# Patient Record
Sex: FEMALE | Race: WHITE | URBAN - METROPOLITAN AREA
[De-identification: names, ages, dates, MRNs, and addresses within clinical notes are randomized per-mention and may not be internally consistent; named-entity substitution may affect disease eponyms.]

---

## 2021-09-17 ENCOUNTER — OFFICE VISIT (OUTPATIENT)
Dept: OBGYN CLINIC | Age: 41
End: 2021-09-17
Payer: COMMERCIAL

## 2021-09-17 VITALS
DIASTOLIC BLOOD PRESSURE: 70 MMHG | SYSTOLIC BLOOD PRESSURE: 104 MMHG | HEIGHT: 69 IN | BODY MASS INDEX: 23.55 KG/M2 | WEIGHT: 159 LBS

## 2021-09-17 DIAGNOSIS — N89.8 VAGINAL ITCHING: ICD-10-CM

## 2021-09-17 DIAGNOSIS — Z11.3 SCREEN FOR STD (SEXUALLY TRANSMITTED DISEASE): ICD-10-CM

## 2021-09-17 DIAGNOSIS — N76.0 RECURRENT VAGINITIS: ICD-10-CM

## 2021-09-17 DIAGNOSIS — N89.8 VAGINAL IRRITATION: ICD-10-CM

## 2021-09-17 DIAGNOSIS — R39.15 URINARY URGENCY: ICD-10-CM

## 2021-09-17 DIAGNOSIS — N89.8 VAGINAL DISCHARGE: Primary | ICD-10-CM

## 2021-09-17 LAB
BILIRUBIN, POC: NORMAL
BLOOD URINE, POC: NORMAL
CLARITY, POC: CLEAR
COLOR, POC: YELLOW
GLUCOSE URINE, POC: NORMAL
KETONES, POC: NORMAL
LEUKOCYTE EST, POC: NORMAL
NITRITE, POC: NORMAL
PH, POC: 6
PROTEIN, POC: NORMAL
SPECIFIC GRAVITY, POC: 1.02
UROBILINOGEN, POC: 3.5

## 2021-09-17 PROCEDURE — 81003 URINALYSIS AUTO W/O SCOPE: CPT | Performed by: ADVANCED PRACTICE MIDWIFE

## 2021-09-17 PROCEDURE — 99214 OFFICE O/P EST MOD 30 MIN: CPT | Performed by: ADVANCED PRACTICE MIDWIFE

## 2021-09-17 RX ORDER — SECNIDAZOLE 2 G/4.8G
1 GRANULE ORAL ONCE
Qty: 1 EACH | Refills: 2 | Status: SHIPPED | OUTPATIENT
Start: 2021-09-17 | End: 2021-09-17

## 2021-09-17 RX ORDER — FLUCONAZOLE 150 MG/1
TABLET ORAL
Qty: 3 TABLET | Refills: 1 | Status: SHIPPED | OUTPATIENT
Start: 2021-09-17

## 2021-09-17 ASSESSMENT — ENCOUNTER SYMPTOMS
CONSTIPATION: 0
SHORTNESS OF BREATH: 0
ABDOMINAL PAIN: 0
NAUSEA: 0
BACK PAIN: 0
COUGH: 0
DIARRHEA: 0
VOMITING: 0

## 2021-09-17 ASSESSMENT — PATIENT HEALTH QUESTIONNAIRE - PHQ9
1. LITTLE INTEREST OR PLEASURE IN DOING THINGS: 0
SUM OF ALL RESPONSES TO PHQ QUESTIONS 1-9: 0
2. FEELING DOWN, DEPRESSED OR HOPELESS: 0
SUM OF ALL RESPONSES TO PHQ9 QUESTIONS 1 & 2: 0
SUM OF ALL RESPONSES TO PHQ QUESTIONS 1-9: 0
SUM OF ALL RESPONSES TO PHQ QUESTIONS 1-9: 0

## 2021-09-17 NOTE — PROGRESS NOTES
SUBJECTIVE:  Arnoldo Alatorre is a 35 y.o. female who presents here today for complaints of:      Chief Complaint   Patient presents with    Vaginal Itching     new pt in with onset of sx 2-3 weeks ago, and with recurrent BV since delivering 2017.  Student     also symptoms of dryness    Urinary Tract Infection     patient reports symptoms of urgency to urinate     Vaginitis, Possible UTI - here today with complaints of:  Return of vaginal discharge with increased odor. Whenever antibiotics are taken, she then develops Itching/irritation.  Frustrated that bacterial symptoms have continued to return despite treatment with Metrogel.  Associated symptoms:   o Denies fever, headache, malaise, lymphadenopathy, myalgias. o Urinary frequency without dysuria and urgency.  Sexual Health:  o Gender of Sexual Partners:  Male  o Number of sexual contacts within the past 12 months:  1  o Possible exposure to an STD within the past 12 months:  No  o Personal history of STD's:  Herpes Simplex    Contraceptive Method:  None, Vasectomy    Review of Systems   Respiratory: Negative for cough and shortness of breath. Gastrointestinal: Negative for abdominal pain, constipation, diarrhea, nausea and vomiting. Genitourinary: Positive for frequency and vaginal discharge. Negative for difficulty urinating, dysuria, hematuria, menstrual problem, pelvic pain, urgency and vaginal bleeding. Musculoskeletal: Negative for back pain. All other systems reviewed and are negative.     OBJECTIVE:  Vitals:  /70   Ht 5' 9\" (1.753 m)   Wt 159 lb (72.1 kg)   LMP 09/10/2021   BMI 23.48 kg/m²       Physical Exam  Appearance:  Normal appearance  Cardiovascular:  Normal rate, Capillary refill less than 2 seconds  Pulmonary:  Normal effort, no distress  Abdominal:  No tenderness  MS:  No Swelling, No dependent edema  Skin:  Warm, dry  Neuro:  Alert and oriented x3, reflexes normal.  Psychiatric:  Normal mood and behavior    ASSESSMENT & PLAN:   Diagnosis Orders   1. Vaginal discharge  Wet Prep, Genital   2. Vaginal irritation  Wet Prep, Genital   3. Vaginal itching  Wet Prep, Genital   4. Recurrent vaginitis  fluconazole (DIFLUCAN) 150 MG tablet    Secnidazole (SOLOSEC) 2 g PACK   5. Urinary urgency  POCT Urinalysis No Micro (Auto)    Culture, Urine   6. Screen for STD (sexually transmitted disease)  Wet Prep, Genital    C.trachomatis N.gonorrhoeae DNA       1. Vaginitis Symptoms:  Discharge, Irritation, Itching  Frequent return of vaginitis symptoms despite treatment. Vaginitis, Bacterial - Rx for Solosec  Vaginitis, Candidal - Rx for Diflucan    2. Urinary Urgency  UA negative, sent for culture  Treat if indicated    3. Screening for STD's  Vaginal cultures collected    Return if symptoms worsen or fail to improve.     Jun Carbajal, YESENIA - RONNA

## 2021-09-17 NOTE — PROGRESS NOTES
The patient was asked if she would like a chaperone present for her intimate exam. She  Declined the chaperone.  Keyur Boyer MA

## 2023-03-23 ENCOUNTER — TELEPHONE (OUTPATIENT)
Dept: PRIMARY CARE | Facility: CLINIC | Age: 43
End: 2023-03-23
Payer: COMMERCIAL

## 2023-03-23 DIAGNOSIS — B96.89 SINUSITIS, BACTERIAL: Primary | ICD-10-CM

## 2023-03-23 DIAGNOSIS — J32.9 SINUSITIS, BACTERIAL: Primary | ICD-10-CM

## 2023-05-30 LAB
ERYTHROCYTE DISTRIBUTION WIDTH (RATIO) BY AUTOMATED COUNT: 14 % (ref 11.5–14.5)
ERYTHROCYTE MEAN CORPUSCULAR HEMOGLOBIN CONCENTRATION (G/DL) BY AUTOMATED: 31.7 G/DL (ref 32–36)
ERYTHROCYTE MEAN CORPUSCULAR VOLUME (FL) BY AUTOMATED COUNT: 91 FL (ref 80–100)
ERYTHROCYTES (10*6/UL) IN BLOOD BY AUTOMATED COUNT: 4.16 X10E12/L (ref 4–5.2)
HEMATOCRIT (%) IN BLOOD BY AUTOMATED COUNT: 37.9 % (ref 36–46)
HEMOGLOBIN (G/DL) IN BLOOD: 12 G/DL (ref 12–16)
LEUKOCYTES (10*3/UL) IN BLOOD BY AUTOMATED COUNT: 6.8 X10E9/L (ref 4.4–11.3)
PLATELETS (10*3/UL) IN BLOOD AUTOMATED COUNT: 259 X10E9/L (ref 150–450)

## 2023-05-31 LAB — PROLACTIN (UG/L) IN SER/PLAS: 8.7 UG/L (ref 3–20)

## 2023-08-02 LAB
CHLAMYDIA TRACH., AMPLIFIED: NEGATIVE
N. GONORRHEA, AMPLIFIED: NEGATIVE
TRICHOMONAS VAGINALIS: NEGATIVE

## 2023-10-19 PROBLEM — R00.2 PALPITATIONS: Status: ACTIVE | Noted: 2023-10-19

## 2023-10-19 PROBLEM — J34.3 NASAL TURBINATE HYPERTROPHY: Status: ACTIVE | Noted: 2023-10-19

## 2023-10-19 PROBLEM — A60.04 HERPES SIMPLEX VULVOVAGINITIS: Status: ACTIVE | Noted: 2023-10-19

## 2023-10-19 PROBLEM — F41.9 ANXIETY DISORDER: Status: ACTIVE | Noted: 2023-10-19

## 2023-10-19 PROBLEM — M54.50 CHRONIC LOW BACK PAIN: Status: ACTIVE | Noted: 2023-10-19

## 2023-10-19 PROBLEM — J31.0 CHRONIC RHINITIS: Status: ACTIVE | Noted: 2023-10-19

## 2023-10-19 PROBLEM — H69.90 EUSTACHIAN TUBE DYSFUNCTION: Status: ACTIVE | Noted: 2023-10-19

## 2023-10-19 PROBLEM — G89.29 CHRONIC LOW BACK PAIN: Status: ACTIVE | Noted: 2023-10-19

## 2023-10-19 PROBLEM — N94.6 DYSMENORRHEA: Status: ACTIVE | Noted: 2023-10-19

## 2023-10-19 PROBLEM — R92.30 DENSE BREAST TISSUE ON MAMMOGRAM: Status: ACTIVE | Noted: 2023-10-19

## 2023-10-19 PROBLEM — N93.9 ABNORMAL UTERINE BLEEDING (AUB): Status: ACTIVE | Noted: 2023-10-19

## 2023-10-19 PROBLEM — R51.9 HEADACHE: Status: ACTIVE | Noted: 2023-10-19

## 2023-10-19 PROBLEM — R53.83 FATIGUE: Status: ACTIVE | Noted: 2023-10-19

## 2023-10-19 RX ORDER — MULTIVITAMIN
1 TABLET ORAL DAILY
COMMUNITY
Start: 2021-03-18

## 2023-10-19 RX ORDER — FLUTICASONE PROPIONATE 50 MCG
SPRAY, SUSPENSION (ML) NASAL
COMMUNITY
Start: 2021-03-18 | End: 2024-04-01 | Stop reason: ALTCHOICE

## 2023-10-19 RX ORDER — FAMCICLOVIR 500 MG/1
TABLET ORAL
COMMUNITY
Start: 2020-08-04

## 2023-10-19 RX ORDER — DEXLANSOPRAZOLE 30 MG/1
20 CAPSULE, DELAYED RELEASE ORAL
COMMUNITY
Start: 2021-03-18

## 2023-10-20 ENCOUNTER — APPOINTMENT (OUTPATIENT)
Dept: OBSTETRICS AND GYNECOLOGY | Facility: CLINIC | Age: 43
End: 2023-10-20
Payer: COMMERCIAL

## 2023-10-27 ENCOUNTER — OFFICE VISIT (OUTPATIENT)
Dept: OBSTETRICS AND GYNECOLOGY | Facility: CLINIC | Age: 43
End: 2023-10-27
Payer: COMMERCIAL

## 2023-10-27 VITALS
HEIGHT: 69 IN | BODY MASS INDEX: 25.94 KG/M2 | WEIGHT: 175.13 LBS | SYSTOLIC BLOOD PRESSURE: 118 MMHG | DIASTOLIC BLOOD PRESSURE: 78 MMHG

## 2023-10-27 DIAGNOSIS — Z30.431 IUD CHECK UP: ICD-10-CM

## 2023-10-27 DIAGNOSIS — N93.9 ABNORMAL UTERINE BLEEDING (AUB): Primary | ICD-10-CM

## 2023-10-27 PROCEDURE — 99213 OFFICE O/P EST LOW 20 MIN: CPT | Performed by: STUDENT IN AN ORGANIZED HEALTH CARE EDUCATION/TRAINING PROGRAM

## 2023-10-27 RX ORDER — LEVONORGESTREL 52 MG/1
1 INTRAUTERINE DEVICE INTRAUTERINE ONCE
COMMUNITY
Start: 2023-08-01 | End: 2024-04-01 | Stop reason: ALTCHOICE

## 2023-10-27 NOTE — PROGRESS NOTES
Subjective   Patient ID [id number]   42 y/o  presenting for IUD check. Patient with AUB s/p negative workup including CBC, prolactin, and TVUS showing a 10cm uterus without masses. She then had Mirena IUD inserted on 23 without complication. Pt still having heavy painful periods lasting 2 weeks since IUD. Most recent period has gotten lighter, now only needing one super tampon per day. Taking NSAIDs for pain with some relief. Breakthrough bleeding seems to be improving.  is s/p vasectomy, has had intercourse without issue. Reports failing multiple prior methods including OCPs and Nuva ring. Does think she is starting to have hot flashes.       Objective   Physical Exam  Vitals:    10/27/23 0954   BP: 118/78      Gen: awake, alert  Head: NCAT  HEENT: moist mucus membranes  Pulm: breathing comfortably on room air  CV: warm and well-perfused  : no blood in vault, strings visualized at os  Neuro: alert and oriented  Psych: appropriate affect     Assessment/Plan     Mar Duque is a 43 y.o.  with AUB presenting for IUD string check    AUB  -TVUS images personally reviewed, 10cm uterus, suspect adenomyosis  -Mirena IUD placed , initially with heavy bleeding and cramping that seems to be improving  -Discussed trial of cOCPs vs scheduled NSAIDs. Desires to avoid estrogen. Will trial scheduled NSAIDs with next cycle  -Has failed multiple methods in the past. Had questions about copper IUD, would not recommend. Also discussed trial of high dose progestins, reviewed side effects including mood changes and weight gain, patient prefers to avoid  -Amenable to continuing LNG-IUD for now, discussed first line for adenomyosis. Did also review ablation (would not recommend given concern for adeno) as well as definitive management with hysterectomy. Will plan for close outpatient follow-up and continued monitoring of symptoms  -Has now turned 43 since AUB workup initiated, no other risk factors for  hyperplasia. Should AUB persist despite IUD, would consider EMB. To discuss at next visit    RTC in 3 months to follow-up menses    Tamia Sullivan MD

## 2023-12-21 ENCOUNTER — TELEPHONE (OUTPATIENT)
Dept: OBSTETRICS AND GYNECOLOGY | Facility: CLINIC | Age: 43
End: 2023-12-21
Payer: COMMERCIAL

## 2023-12-21 NOTE — TELEPHONE ENCOUNTER
Pt called regarding a question about her IUD patient refused to discuss further info on the subject

## 2023-12-22 DIAGNOSIS — B37.31 VAGINAL YEAST INFECTION: Primary | ICD-10-CM

## 2023-12-22 RX ORDER — FLUCONAZOLE 150 MG/1
150 TABLET ORAL ONCE
Qty: 1 TABLET | Refills: 0 | Status: SHIPPED | OUTPATIENT
Start: 2023-12-22 | End: 2023-12-22

## 2024-01-02 NOTE — TELEPHONE ENCOUNTER
Patient paged emergency line with symptoms of yeast infection. Called back, straight to voicemail. Left voicemail encouraging patient to call office tomorrow during business hours to discuss next steps.     Tamia Sullivan MD

## 2024-01-03 ENCOUNTER — TELEPHONE (OUTPATIENT)
Dept: OBSTETRICS AND GYNECOLOGY | Facility: CLINIC | Age: 44
End: 2024-01-03
Payer: COMMERCIAL

## 2024-01-03 DIAGNOSIS — B37.9 CANDIDA INFECTION: Primary | ICD-10-CM

## 2024-01-03 RX ORDER — FLUCONAZOLE 150 MG/1
150 TABLET ORAL ONCE
Qty: 1 TABLET | Refills: 0 | Status: SHIPPED | OUTPATIENT
Start: 2024-01-03 | End: 2024-01-03

## 2024-01-04 NOTE — TELEPHONE ENCOUNTER
Mar returned your call. She thought she pressed NR option. She might have hung up with me and tried again, but if she didn't reach you, Please call her back

## 2024-01-08 NOTE — TELEPHONE ENCOUNTER
Pt called back, I told her she needed to be scheduled, and she said she could not come in until 4pm any day as she can not take off any more work, I told her I would ask if we can even give her a 4pm with anyone as I did state it is used for OB patients.

## 2024-01-08 NOTE — TELEPHONE ENCOUNTER
Pt called in and believes both her and her  are passing the infection back and forth. She talked to a pharmacist yesterday who told her both of them would need a prescription. She is requesting a call back after 2:15pm today in regards to getting another prescription. I did tell her it looks like she is supposed to be seen, but will have her discuss with you.

## 2024-01-09 ENCOUNTER — OFFICE VISIT (OUTPATIENT)
Dept: OBSTETRICS AND GYNECOLOGY | Facility: CLINIC | Age: 44
End: 2024-01-09
Payer: COMMERCIAL

## 2024-01-09 VITALS
HEIGHT: 69 IN | WEIGHT: 179 LBS | BODY MASS INDEX: 26.51 KG/M2 | DIASTOLIC BLOOD PRESSURE: 78 MMHG | SYSTOLIC BLOOD PRESSURE: 120 MMHG

## 2024-01-09 DIAGNOSIS — N89.8 VAGINAL ITCHING: Primary | ICD-10-CM

## 2024-01-09 PROCEDURE — 87205 SMEAR GRAM STAIN: CPT

## 2024-01-09 PROCEDURE — 99214 OFFICE O/P EST MOD 30 MIN: CPT | Performed by: MIDWIFE

## 2024-01-09 PROCEDURE — 1036F TOBACCO NON-USER: CPT | Performed by: MIDWIFE

## 2024-01-09 RX ORDER — METRONIDAZOLE 500 MG/1
500 TABLET ORAL 2 TIMES DAILY
Qty: 14 TABLET | Refills: 0 | Status: SHIPPED | OUTPATIENT
Start: 2024-01-09 | End: 2024-01-16

## 2024-01-09 RX ORDER — FLUCONAZOLE 150 MG/1
150 TABLET ORAL ONCE
Qty: 2 TABLET | Refills: 0 | Status: SHIPPED | OUTPATIENT
Start: 2024-01-09 | End: 2024-01-09

## 2024-01-09 NOTE — PROGRESS NOTES
Subjective   Mar Duque is a 43 y.o. female who presents for evaluation of an abnormal vaginal discharge and bleeding. Symptoms have been present for several weeks off and on. Started with recent augmentin use that she finished 2 weeks ago. Treated with diflucan and symptoms returned with intercourse.    Vaginal symptoms: discharge described as malodorous and thick, local irritation, and itchy Contraception: IUD. She reports irregular bleeding since placement about 6 months ago. IUD was originally placed for heavy, painful menses. Strongly considering removal.      Objective   Physical Exam  Constitutional:       Appearance: Normal appearance.   Eyes:      Pupils: Pupils are equal, round, and reactive to light.   Cardiovascular:      Rate and Rhythm: Normal rate and regular rhythm.      Pulses: Normal pulses.      Heart sounds: Normal heart sounds.   Pulmonary:      Effort: Pulmonary effort is normal.   Genitourinary:     General: Normal vulva.      Vagina: Vaginal discharge present.   Musculoskeletal:      Cervical back: Normal range of motion.   Skin:     General: Skin is warm and dry.   Neurological:      General: No focal deficit present.      Mental Status: She is alert and oriented to person, place, and time.   Psychiatric:         Mood and Affect: Mood normal.         Behavior: Behavior normal.         Thought Content: Thought content normal.         Judgment: Judgment normal.          Assessment/Plan   A: vaginal discharge with odor      AUB      Mirena in place      Vaginal itching      Recurrent vaginosis    P: Reviewed BP, weight      Metronidazole and diflucan ordered      Discussed vaginal hygiene, recommended avoiding harsh soaps, fragrances       Recommended vitamin D supplementation and probiotics      Discussed IUD removal and uterine ablation      Will follow up per results      Consult with Dr. Tsai scheduled for ablation

## 2024-01-09 NOTE — PROGRESS NOTES
Patient being seen for abnormal vaginal itching/odor (started couple weeks ago)  Patient was given antibiotic   Patient spotting.  Has Mirena IUD   Patient concerned about weight gain from IUD.    Iris Farr MA

## 2024-01-10 ENCOUNTER — APPOINTMENT (OUTPATIENT)
Dept: OBSTETRICS AND GYNECOLOGY | Facility: CLINIC | Age: 44
End: 2024-01-10
Payer: COMMERCIAL

## 2024-01-10 LAB
CLUE CELLS VAG LPF-#/AREA: NORMAL /[LPF]
NUGENT SCORE: 0
YEAST VAG WET PREP-#/AREA: NORMAL

## 2024-01-11 DIAGNOSIS — N89.8 VAGINAL ITCHING: Primary | ICD-10-CM

## 2024-01-11 RX ORDER — CLOBETASOL PROPIONATE 0.5 MG/G
OINTMENT TOPICAL 2 TIMES DAILY
Qty: 45 G | Refills: 2 | Status: SHIPPED | OUTPATIENT
Start: 2024-01-11

## 2024-01-15 ENCOUNTER — TELEPHONE (OUTPATIENT)
Dept: OBSTETRICS AND GYNECOLOGY | Facility: CLINIC | Age: 44
End: 2024-01-15

## 2024-01-15 ENCOUNTER — APPOINTMENT (OUTPATIENT)
Dept: OBSTETRICS AND GYNECOLOGY | Facility: CLINIC | Age: 44
End: 2024-01-15
Payer: COMMERCIAL

## 2024-01-15 ENCOUNTER — OFFICE VISIT (OUTPATIENT)
Dept: OBSTETRICS AND GYNECOLOGY | Facility: CLINIC | Age: 44
End: 2024-01-15
Payer: COMMERCIAL

## 2024-01-15 VITALS
HEIGHT: 69 IN | SYSTOLIC BLOOD PRESSURE: 118 MMHG | DIASTOLIC BLOOD PRESSURE: 80 MMHG | BODY MASS INDEX: 26.53 KG/M2 | WEIGHT: 179.13 LBS

## 2024-01-15 DIAGNOSIS — B00.9 HSV (HERPES SIMPLEX VIRUS) INFECTION: Primary | ICD-10-CM

## 2024-01-15 DIAGNOSIS — N92.0 MENORRHAGIA WITH REGULAR CYCLE: ICD-10-CM

## 2024-01-15 PROCEDURE — 99213 OFFICE O/P EST LOW 20 MIN: CPT | Performed by: OBSTETRICS & GYNECOLOGY

## 2024-01-15 PROCEDURE — 58301 REMOVE INTRAUTERINE DEVICE: CPT | Performed by: OBSTETRICS & GYNECOLOGY

## 2024-01-15 PROCEDURE — 1036F TOBACCO NON-USER: CPT | Performed by: OBSTETRICS & GYNECOLOGY

## 2024-01-15 RX ORDER — VALACYCLOVIR HYDROCHLORIDE 500 MG/1
500 TABLET, FILM COATED ORAL DAILY
Qty: 90 TABLET | Refills: 3 | Status: SHIPPED | OUTPATIENT
Start: 2024-01-15 | End: 2025-01-14

## 2024-01-15 RX ORDER — VALACYCLOVIR HYDROCHLORIDE 1 G/1
1000 TABLET, FILM COATED ORAL 2 TIMES DAILY
Qty: 20 TABLET | Refills: 0 | Status: SHIPPED | OUTPATIENT
Start: 2024-01-15 | End: 2024-03-11 | Stop reason: SDUPTHER

## 2024-01-15 RX ORDER — TRANEXAMIC ACID 650 MG/1
1300 TABLET ORAL 3 TIMES DAILY
Qty: 30 TABLET | Refills: 11 | Status: SHIPPED | OUTPATIENT
Start: 2024-01-15 | End: 2024-04-01 | Stop reason: ALTCHOICE

## 2024-01-15 NOTE — TELEPHONE ENCOUNTER
Pt called in regards to her meds that where sent over. Agustina Karson sent over an ointment and wanted more clarification on that and also regarding if she has another out break if she should schedule an appt or will the fanvir be refilled.

## 2024-01-15 NOTE — PROGRESS NOTES
Pt would like to discuss removing IUD, and ablation.  Pt has had consistent bleeding since 8/23  Subjective   Patient ID: Mar Duque is a 43 y.o. female who presents for IUD removal and Ablation discussion.  HPI  Pt presents for irregular bleeding.  Pt got IUD Aug 2023.  Pt has bleeding from Aug to beginning of Nov.  Stopped bleeding for 1.5months.  Got Augmentin for a sinus infection and got a yeast infection.  Was just seen for BV/yeast last week.  Feels better since treatment.  Had some breakthrough bleeding with taking antibiotics.  Pt would to talk about an ablation or anything else that might help if possible.  She also wants her IUD out today.  She has irregular bleeding and back pain and wt gain with it.    Review of Systems  all other symptoms were found to be neg except for HPI/CC.      Objective   Physical Exam  General  General Appearance - normal build and Well groomed, Not in acute distress, No acute respiratory distress.  Mental Status - Alert.    Integumentary  - - warm and dry with no rashes.    Head and Neck  - - normalocephalic.    Eye  - - Bilateral - pupils equal and round and sclera clear.    Chest and Lung Exam  - - Bilateral - normal breathing effort.    Female Genitourinary  - - vulva normal without rash or lesion and no vaginal discharge.    Musculoskeletal  - - normal posture and normal gait and station.    Cervix: WNL, strings seen at 3cmPatient ID: Mar Duque is a 43 y.o. female.    IUD Removal    Date/Time: 1/15/2024 1:05 PM    Performed by: Cynthia Tsai DO  Authorized by: Cynthia Tsai DO    Consent:     Consent obtained:  Written    Consent given by:  Patient    Procedure risks and benefits discussed: yes      Patient questions answered: yes      Patient agrees, verbalizes understanding, and wants to proceed: yes      Educational handouts given: no      Instructions and paperwork completed: yes    Procedure:     Removed with no complications: yes      Removal due to  mechanical complications of IUD: no      Removal due to infection and inflammatory reaction: no      Other reason for removal:  Back pain, irregular bleeding        Assessment/Plan   Diagnoses and all orders for this visit:  HSV (herpes simplex virus) infection  -     valACYclovir (Valtrex) 1 gram tablet; Take 1 tablet (1,000 mg) by mouth 2 times a day for 10 days.  -     valACYclovir (Valtrex) 500 mg tablet; Take 1 tablet (500 mg) by mouth once daily.  Menorrhagia with regular cycle  -     tranexamic acid (Lysteda) 650 mg tablet tablet; Take 2 tablets (1,300 mg) by mouth 3 times a day.  Other orders  -     IUD Removal     Heavy periods  Talked about treatment options from medical to surgical   IUD removed  Lysteda ordered, went over how to take  Pt to call after next cycle to tell how she did    HSV: gave proph and treatment gregory Tsai DO 01/15/24 11:02 AM

## 2024-01-17 ENCOUNTER — TELEPHONE (OUTPATIENT)
Dept: OBSTETRICS AND GYNECOLOGY | Facility: CLINIC | Age: 44
End: 2024-01-17
Payer: COMMERCIAL

## 2024-01-17 NOTE — TELEPHONE ENCOUNTER
----- Message from JOSE LUIS Dean sent at 1/11/2024  9:51 AM EST -----  Vaginitis swab was negative. STILL continue taking the metronidazole/diflucan as we discussed in the office.  I am also ordering a clobetasol vaginal cream. After you've completed the metronidazole/diflucan, if/when itchiness returns (especially after intercourse) use the cream!

## 2024-01-26 ENCOUNTER — TELEPHONE (OUTPATIENT)
Dept: OBSTETRICS AND GYNECOLOGY | Facility: CLINIC | Age: 44
End: 2024-01-26
Payer: COMMERCIAL

## 2024-01-26 DIAGNOSIS — B37.9 YEAST INFECTION: Primary | ICD-10-CM

## 2024-01-26 DIAGNOSIS — B96.89 BACTERIAL VAGINOSIS: Primary | ICD-10-CM

## 2024-01-26 DIAGNOSIS — N76.0 BACTERIAL VAGINOSIS: Primary | ICD-10-CM

## 2024-01-26 RX ORDER — METRONIDAZOLE 7.5 MG/G
GEL VAGINAL DAILY
Qty: 70 G | Refills: 0 | Status: SHIPPED | OUTPATIENT
Start: 2024-01-26 | End: 2024-01-31

## 2024-01-26 NOTE — TELEPHONE ENCOUNTER
Pt saw AW on 1/15 and MS on 1/9. Tested negative for BV/yeast but says she is still feeling irritation and itch. Please call pt to advise. Pt states that if something is called in she prefers the gel for BV rather than pills.

## 2024-01-29 DIAGNOSIS — B37.0 ORAL YEAST INFECTION: Primary | ICD-10-CM

## 2024-01-29 RX ORDER — FLUCONAZOLE 100 MG/1
100 TABLET ORAL DAILY
Qty: 10 TABLET | Refills: 0 | Status: SHIPPED | OUTPATIENT
Start: 2024-01-29 | End: 2024-02-08

## 2024-01-29 NOTE — TELEPHONE ENCOUNTER
Called patient   LVM informing of Dr Tsai message    Cynthia Tsai, DO  You3 days ago       Called in metrogel for this patient since weekend.  If Pt gets done with this and it is still there, will need to be seen again.     Iris Farr MA

## 2024-02-16 ENCOUNTER — APPOINTMENT (OUTPATIENT)
Dept: OBSTETRICS AND GYNECOLOGY | Facility: CLINIC | Age: 44
End: 2024-02-16
Payer: COMMERCIAL

## 2024-02-26 ENCOUNTER — APPOINTMENT (OUTPATIENT)
Dept: PRIMARY CARE | Facility: CLINIC | Age: 44
End: 2024-02-26
Payer: COMMERCIAL

## 2024-03-05 DIAGNOSIS — B00.9 HSV (HERPES SIMPLEX VIRUS) INFECTION: ICD-10-CM

## 2024-03-11 RX ORDER — VALACYCLOVIR HYDROCHLORIDE 1 G/1
1000 TABLET, FILM COATED ORAL 2 TIMES DAILY
Qty: 20 TABLET | Refills: 0 | Status: SHIPPED | OUTPATIENT
Start: 2024-03-11 | End: 2024-03-21

## 2024-03-13 ENCOUNTER — OFFICE VISIT (OUTPATIENT)
Dept: ORTHOPEDIC SURGERY | Facility: CLINIC | Age: 44
End: 2024-03-13
Payer: COMMERCIAL

## 2024-03-13 ENCOUNTER — HOSPITAL ENCOUNTER (OUTPATIENT)
Dept: RADIOLOGY | Facility: CLINIC | Age: 44
Discharge: HOME | End: 2024-03-13
Payer: COMMERCIAL

## 2024-03-13 DIAGNOSIS — M25.562 LEFT KNEE PAIN, UNSPECIFIED CHRONICITY: ICD-10-CM

## 2024-03-13 DIAGNOSIS — S83.242S ACUTE MEDIAL MENISCUS TEAR, LEFT, SEQUELA: ICD-10-CM

## 2024-03-13 PROCEDURE — 99213 OFFICE O/P EST LOW 20 MIN: CPT | Performed by: ORTHOPAEDIC SURGERY

## 2024-03-13 PROCEDURE — 73564 X-RAY EXAM KNEE 4 OR MORE: CPT | Mod: LT

## 2024-03-13 PROCEDURE — 99203 OFFICE O/P NEW LOW 30 MIN: CPT | Performed by: ORTHOPAEDIC SURGERY

## 2024-03-13 PROCEDURE — 73564 X-RAY EXAM KNEE 4 OR MORE: CPT | Mod: LEFT SIDE | Performed by: ORTHOPAEDIC SURGERY

## 2024-03-13 PROCEDURE — 1036F TOBACCO NON-USER: CPT | Performed by: ORTHOPAEDIC SURGERY

## 2024-03-13 RX ORDER — MELOXICAM 15 MG/1
15 TABLET ORAL DAILY
Qty: 30 TABLET | Refills: 0 | Status: SHIPPED | OUTPATIENT
Start: 2024-03-13 | End: 2024-04-01 | Stop reason: ALTCHOICE

## 2024-03-13 NOTE — PROGRESS NOTES
History of Present Illness:   The patient presents today endorsing left knee pain. The pain localizes over the medial joint line.  The patient denies any recent trauma and notes the insidious onset of pain.  The pain is achy, constant, worse with activity and better with rest. The patient notes occasional locking, catching and giving out.  The patient has tried the following modalities: Rest, ice, Tylenol, bracing.    Patient has been wary of NSAIDs due to side effect profile, she does not have kidney damage, does not have history of stomach ulcers, and does not have uncontrolled hypertension.    She is inquiring today about options for MRI.  Her  is a patient of ours who had previously undergone total knee arthroplasty.  She currently works as a lunch aide for a school system.    No past medical history on file.  No past surgical history on file.    Current Outpatient Medications:     clobetasol (Temovate) 0.05 % ointment, Apply topically 2 times a day., Disp: 45 g, Rfl: 2    dexlansoprazole (Dexilant) 30 mg DR capsule, Take 20 mg by mouth once daily in the morning. Take before meals., Disp: , Rfl:     famciclovir (Famvir) 500 mg tablet, TAKE 2 TABLET Every twelve hours PRN outbreak take 2 doses for a total of 4 tabs per episode then stop, Disp: , Rfl:     fluticasone (Flonase) 50 mcg/actuation nasal spray, SHAKE LIQUID AND USE 2 SPRAYS IN EACH NOSTRIL DAILY, Disp: , Rfl:     levonorgestrel (Mirena) 21 mcg/24 hours (8 yrs) 52 mg IUD, 52 mg by intrauterine route 1 time., Disp: , Rfl:     multivitamin tablet, Take 1 tablet by mouth once daily., Disp: , Rfl:     tranexamic acid (Lysteda) 650 mg tablet tablet, Take 2 tablets (1,300 mg) by mouth 3 times a day., Disp: 30 tablet, Rfl: 11    valACYclovir (Valtrex) 1 gram tablet, TAKE 1 TABLET (1,000 MG) BY MOUTH 2 TIMES A DAY FOR 10 DAYS., Disp: 20 tablet, Rfl: 0    valACYclovir (Valtrex) 500 mg tablet, Take 1 tablet (500 mg) by mouth once daily., Disp: 90 tablet,  Rfl: 3    Review of Systems   GENERAL: Negative for malaise, significant weight loss, fever  MUSCULOSKELETAL: See HPI  NEURO:  Negative for numbness / tingling     Physical Examination:  Left Knee:  Skin healthy and intact  No gross swelling or ecchymosis  No significant varus or valgus malalignment  Effusion: absent    ROM:  Full flexion   Full extension  No pain with internal rotation of the hip  Tenderness to palpation:  medial joint line   No laxity to valgus stress  No laxity to varus stress  Negative Lachman´s test  Negative anterior drawer test  Negative posterior drawer test  Positive Sheba´s test  Neurovascular exam normal distally    Imaging:  Plain films of the left knee reveal no acute fractures or dislocation, good alignment and position, no substantial signs of degenerative changes noted.    Assessment:    Patient with left knee pain concern for meniscal tear versus other source of mechanical symptoms including chondral flap or fissure.    Plan:  We discussed with the patient concern for a meniscal tear.   We reviewed the natural history of meniscal pathology and discussed the implications for the health of the joint.  Various treatment options were discussed and the patient elected for MRI for further evaluation.  We highly encouraged nonsteroidal anti-inflammatories to control pain and swelling.  A nonsteroidal anti-inflammatory drug (NSAID) was prescribed.  We reviewed the risks (including gastric issues, renal issues) and benefits of the medication.  We discussed a scheduled course if no adverse reactions to help with swelling / pain.  We discussed that chronic usage should be checked with primary care physician.       Rick Blunt PA-C     In a face to face encounter, I evaluated the patient and performed a physical examination, discussed pertinent diagnostic studies if indicated and discussed diagnosis and management strategies with both the patient and physician assistant / nurse  practitioner.  I reviewed the PA/NP's note and agree with the documented findings and plan of care.        Ovi Asif MD

## 2024-03-18 DIAGNOSIS — S83.242S ACUTE MEDIAL MENISCUS TEAR, LEFT, SEQUELA: ICD-10-CM

## 2024-03-18 DIAGNOSIS — M25.562 LEFT KNEE PAIN, UNSPECIFIED CHRONICITY: ICD-10-CM

## 2024-03-22 ENCOUNTER — APPOINTMENT (OUTPATIENT)
Dept: RADIOLOGY | Facility: HOSPITAL | Age: 44
End: 2024-03-22
Payer: COMMERCIAL

## 2024-04-01 ENCOUNTER — OFFICE VISIT (OUTPATIENT)
Dept: NEUROLOGY | Facility: CLINIC | Age: 44
End: 2024-04-01
Payer: COMMERCIAL

## 2024-04-01 VITALS
HEIGHT: 69 IN | BODY MASS INDEX: 26.36 KG/M2 | DIASTOLIC BLOOD PRESSURE: 72 MMHG | HEART RATE: 80 BPM | WEIGHT: 178 LBS | RESPIRATION RATE: 16 BRPM | SYSTOLIC BLOOD PRESSURE: 104 MMHG

## 2024-04-01 DIAGNOSIS — G43.709 CHRONIC MIGRAINE W/O AURA W/O STATUS MIGRAINOSUS, NOT INTRACTABLE: Primary | ICD-10-CM

## 2024-04-01 PROCEDURE — 99205 OFFICE O/P NEW HI 60 MIN: CPT | Performed by: STUDENT IN AN ORGANIZED HEALTH CARE EDUCATION/TRAINING PROGRAM

## 2024-04-01 PROCEDURE — 1036F TOBACCO NON-USER: CPT | Performed by: STUDENT IN AN ORGANIZED HEALTH CARE EDUCATION/TRAINING PROGRAM

## 2024-04-01 RX ORDER — MELOXICAM 7.5 MG/1
TABLET ORAL
COMMUNITY
Start: 2016-08-29 | End: 2024-04-01 | Stop reason: ALTCHOICE

## 2024-04-01 RX ORDER — GREEN TEA LEAF EXTRACT 250 MG
CAPSULE ORAL
COMMUNITY

## 2024-04-01 RX ORDER — FLUTICASONE PROPIONATE 0.5 MG/G
CREAM TOPICAL
COMMUNITY
Start: 2023-10-13

## 2024-04-01 RX ORDER — METRONIDAZOLE 500 MG/1
1 TABLET ORAL 2 TIMES DAILY
COMMUNITY
End: 2024-04-01 | Stop reason: ALTCHOICE

## 2024-04-01 RX ORDER — AZITHROMYCIN 250 MG/1
TABLET, FILM COATED ORAL
COMMUNITY
Start: 2024-01-31 | End: 2024-04-01 | Stop reason: ALTCHOICE

## 2024-04-01 RX ORDER — METHYLPREDNISOLONE 4 MG/1
TABLET ORAL
COMMUNITY
Start: 2016-08-29 | End: 2024-04-01 | Stop reason: ALTCHOICE

## 2024-04-01 RX ORDER — IPRATROPIUM BROMIDE 21 UG/1
SPRAY, METERED NASAL
COMMUNITY
Start: 2019-11-15 | End: 2024-04-01 | Stop reason: ALTCHOICE

## 2024-04-01 RX ORDER — METRONIDAZOLE 7.5 MG/G
GEL VAGINAL
COMMUNITY

## 2024-04-01 RX ORDER — RNA INGREDIENT BNT-162B2 0.23 G/1.8ML
INJECTION, SUSPENSION INTRAMUSCULAR
COMMUNITY
End: 2024-04-01 | Stop reason: ALTCHOICE

## 2024-04-01 RX ORDER — FLUCONAZOLE 100 MG/1
TABLET ORAL
COMMUNITY

## 2024-04-01 RX ORDER — FLUCONAZOLE 150 MG/1
TABLET ORAL
COMMUNITY
Start: 2021-09-17 | End: 2024-04-01 | Stop reason: ALTCHOICE

## 2024-04-01 RX ORDER — GLUCOSAM/CHONDRO/HERB 149/HYAL 750-100 MG
TABLET ORAL
COMMUNITY

## 2024-04-01 RX ORDER — MONTELUKAST SODIUM 10 MG/1
TABLET ORAL
COMMUNITY
End: 2024-04-01 | Stop reason: ALTCHOICE

## 2024-04-01 RX ORDER — LANOLIN ALCOHOL/MO/W.PET/CERES
CREAM (GRAM) TOPICAL
COMMUNITY
Start: 2014-06-09

## 2024-04-01 RX ORDER — LORATADINE 10 MG/1
10 TABLET ORAL
COMMUNITY

## 2024-04-01 RX ORDER — AMOXICILLIN/POTASSIUM CLAV 875-125 MG
TABLET ORAL
COMMUNITY
Start: 2023-12-16 | End: 2024-04-01 | Stop reason: ALTCHOICE

## 2024-04-01 RX ORDER — NAPROXEN 500 MG/1
TABLET ORAL
COMMUNITY

## 2024-04-01 ASSESSMENT — PATIENT HEALTH QUESTIONNAIRE - PHQ9
1. LITTLE INTEREST OR PLEASURE IN DOING THINGS: NOT AT ALL
SUM OF ALL RESPONSES TO PHQ9 QUESTIONS 1 AND 2: 0
2. FEELING DOWN, DEPRESSED OR HOPELESS: NOT AT ALL

## 2024-04-01 ASSESSMENT — PAIN SCALES - GENERAL: PAINLEVEL: 0-NO PAIN

## 2024-04-01 ASSESSMENT — ENCOUNTER SYMPTOMS
OCCASIONAL FEELINGS OF UNSTEADINESS: 0
LOSS OF SENSATION IN FEET: 1
DEPRESSION: 0

## 2024-04-01 NOTE — PROGRESS NOTES
Subjective     Chief Complaint: Headache    Mar Duque is a 43 y.o. year old female who presents with chief complaint of headaches.    Mar started getting headaches in her mid 30s. Was getting headaches 30/30 HA days per month. Headaches gradually worsening in frequency and severity. Currently having 25/30 HA days per month. The headaches are usually throbbing and are located frontal temporal and in neck, generally unilateral. The patient rates her most severe headaches a 10 in intensity. Generally, headaches last about 24 hours in duration. Associated nausea, photophobia, and phonophobia. Headaches are worsened with exertion. Triggers include barometric pressure , stress, and tension .    No clear aura.     Headaches worsen after delivering first child.    Excedrin and Aleve taken 25 times a month.     Has been having menopausal symptoms for the last year. Headaches worsened around that time.     Current Acute Headache Treatment None   Current Preventative Headache Treatment None   Previous Acute Headache Treatment Rizatriptan  Sumatriptan   Previous Preventative Headache Treatment Amitriptyline   Topiramate      ROS: As per HPI, otherwise all other systems have been reviewed are negative for complaint.     No past medical history on file.  No past surgical history on file.  Family History   Problem Relation Name Age of Onset    Hypertension Mother      Hypothyroidism Mother      Diabetes Father      Hypertension Father      Hypertension Other Sibling      Social History     Tobacco Use    Smoking status: Never    Smokeless tobacco: Never   Substance Use Topics    Alcohol use: Yes        Objective   There were no vitals taken for this visit.    Neuro Exam:  Cardiac Exam: No apparent edema of b/l lower extremities  Neurological Exam:  MENTAL STATUS:   General Appearance: No distress, alert, interactive, and cooperative. Orientation was normal to time, place and person. Recent and remote memory was intact.      OPHTHALMOSCOPIC:   The ophthalmoscopic exam was normal. The fundi were well visualized with normal disc margins, clear vessels and vascular pulsations. No disc edema. The cup/disk ratio was not enlarged. No hemorrhages or exudates were present in the posterior segments that were visualized.     CRANIAL NERVES:   CN 2         Visual fields full to confrontation.   CN 3, 4, 6   Pupils round, 4 mm in diameter, equally reactive to light. Lids symmetric; no ptosis. EOMs normal alignment, full range with normal saccades, pursuit and convergence.   No nystagmus.   CN 5   Facial sensation intact bilaterally.   CN 7   Normal and symmetric facial strength. Nasolabial folds symmetric.   CN 8   Hearing intact to conversation and finger rub.  CN 9, 10   Palate elevates symmetrically.  CN 11   Normal strength of shoulder shrug and neck turning.   CN 12   Tongue midline, with normal bulk and strength; no fasciculations.     MOTOR:   Muscle bulk and tone were normal in both upper and lower extremities.   No pronator drift bilaterally.  No fasciculations, tremor or other abnormal movements evident with the patient examined clothed.    STRENGTH:  R  L  Deltoid            5          5  Biceps  5 5  Triceps  5 5    Hip flexion 5 5  Knee Flex 5 5  Knee Ex 5 5    REFLEXES: R L  Biceps  2 2                     Triceps  2 2  Patellar  2      SENSORY:   In both upper and lower extremities, sensation was intact to light touch.    COORDINATION:   In both upper extremities, finger-nose-finger was intact without dysmetria or overshoot.     GAIT:   Station was stable with a normal base. Gait was stable with a normal arm swing and speed. No ataxia, shuffling, steppage or waddling was present. No circumduction was present. No Romberg sign was present.    Assessment/Plan   Given the description and frequency of headaches, patient has chronic migraine without aura. Has had inadequate response to topiramate and amitriptyline in the past. Per our  discussion will start Botox for migraine ppx. For breakthrough headaches, will trial Ubrelvy.    - start Ubrelvy 100mg PRN  - start Botox PREEMPT    I personally spent 63 minutes today, exclusive of procedures, providing care for this patient, including preparation, face to face time, documentation and other services such as review of medical records, diagnostic result, patient education, counseling, coordination of care as specified in the encounter.

## 2024-04-02 ENCOUNTER — SPECIALTY PHARMACY (OUTPATIENT)
Dept: PHARMACY | Facility: CLINIC | Age: 44
End: 2024-04-02

## 2024-04-10 ENCOUNTER — EVALUATION (OUTPATIENT)
Dept: PHYSICAL THERAPY | Facility: CLINIC | Age: 44
End: 2024-04-10
Payer: COMMERCIAL

## 2024-04-10 DIAGNOSIS — S83.242D ACUTE MEDIAL MENISCAL TEAR, LEFT, SUBSEQUENT ENCOUNTER: Primary | ICD-10-CM

## 2024-04-10 DIAGNOSIS — S83.242S ACUTE MEDIAL MENISCUS TEAR, LEFT, SEQUELA: ICD-10-CM

## 2024-04-10 DIAGNOSIS — M25.562 LEFT KNEE PAIN, UNSPECIFIED CHRONICITY: ICD-10-CM

## 2024-04-10 PROCEDURE — 97535 SELF CARE MNGMENT TRAINING: CPT | Mod: GP | Performed by: PHYSICAL THERAPIST

## 2024-04-10 PROCEDURE — 97161 PT EVAL LOW COMPLEX 20 MIN: CPT | Mod: GP | Performed by: PHYSICAL THERAPIST

## 2024-04-10 PROCEDURE — 97016 VASOPNEUMATIC DEVICE THERAPY: CPT | Mod: GP | Performed by: PHYSICAL THERAPIST

## 2024-04-10 ASSESSMENT — PAIN SCALES - GENERAL: PAINLEVEL_OUTOF10: 8

## 2024-04-10 ASSESSMENT — PAIN DESCRIPTION - DESCRIPTORS: DESCRIPTORS: SHARP;THROBBING

## 2024-04-10 ASSESSMENT — PAIN - FUNCTIONAL ASSESSMENT: PAIN_FUNCTIONAL_ASSESSMENT: 0-10

## 2024-04-10 NOTE — LETTER
April 10, 2024    Higinio العلي, PT  [address] [place], [address] [place] [address]    Patient: Mar Duque   YOB: 1980   Date of Visit: 4/10/2024       Dear Ovi Asif MD  [address]   [facility], [address] [place] [address],  [place] [address]    The attached plan of care is being sent to you because your patient’s medical reimbursement requires that you certify the plan of care. Your signature is required to allow uninterrupted insurance coverage.      You may indicate your approval by signing below and faxing this form back to us at Dept Fax: [phone].    Please call Dept: [phone] with any questions or concerns.    Thank you for this referral,        Higinio العلي, PT  [place] [address] [facility]  [address] [facility] [address]    Payer: Payor: CIG[employer] / Plan: C[employer]NA HEALTH PLAN / Product Type: *No Product type* /                                                                         Date:     Dear Higinio العلي, PT,     Re: Ms. Mra Duque, MRN:[id number]    I certify that I have reviewed the attached plan of care and it is medically necessary for Ms. Mar Duque (1980) who is under my care.          ______________________________________                    _________________  Provider name and credentials                                           Date and time                                                                                           Plan of Care 4/10/24   Effective from: 4/10/2024  Effective to: 7/9/2024    Plan ID: [id number]            Participants as of Finalize on 4/10/2024    Name Type Comments Contact Info    Ovi Asif MD Referring Provider  [phone]    Higinio العلي PT Physical Therapist  [phone]       Last Plan Note     Author: Higinio العلي PT Status: Incomplete Last edited: 4/10/2024  3:30 PM       PT Initial  Evaluation    Patient Name:  Mar Duque    MRN:  [id number]    :  1980    Today's Date:  04/10/24    Time Calculation  Start Time: 1530  Stop Time: 1612  Time Calculation (min): 42 min  PT Evaluation Time Entry  PT Evaluation (Low) Time Entry: 15  PT Therapeutic Procedures Time Entry  Self-Care/Home Mgmt Trainin  PT Modalities Time Entry  Vasopneumatic Devices Time Entry: 15    Informed Consent  Patient has been informed of all evaluation findings and treatment plans and agrees to participate in Physical Therapy services and plans as outlined.    Diagnosis:  Diagnosis and Precautions: [id number] (ICD-10-CM) - Left knee pain, unspecified spgucjslbwD41.242D (ICD-10-CM) - Acute medial meniscus tear, left, subsequent encounter    Goals:   By the end of 6 visits patient will be able to do the following with < 1/10 L KNEE pain:    HEP:  Patient will consistently perform HER home exercise program for 20-30 minute sessions, 1-2x/day, 3-4 days/week independently by the end of 6 visits.    Basic ADL's:   Patient will perform bADL's/instrumental ADL's for 30 minutes moving between various closed kinetic chain postures.    ROM and Strength:  Patient will demonstrate 4+/5 L KNEE strength in all planes and WNL's L KNEE AROM in all planes to improve their ability to lift, stand, ambulate and perform basic ADL's.    Stair Negotiation:  Patient will be able to ambulate up/down stairs for 1-2 flights at a time.    Gait/Locomotion:  Patient will be able to ambulate for 30-60 minutes at a time.  Minimal to no gait deviation across level ground/stairs.  Patient will demonstrate no L KNEE pain with the following movements:  partial squat, lunge, forward/lateral step-up, HR, stepdown and SLS.    WORK:  Patient will return to WORK and perform normal WORK activities pain free.    Sleep:  Patient will sleep thru the night 4/7 nights/week.    Participation restrictions:  Increase LEFS to > or = to 40/80 for increased  functional ability.    Pain:  Decrease pain at worst to < or = to 1/10 for improved QOL and ability to sleep.    No point tenderness noted over the L anterior knee.     Plan of Care:      Treatment/Interventions: Aquatic therapy, Cryotherapy, Education/ Instruction, Electrical stimulation, Gait training, Manual therapy, Neuromuscular re-education, Self care/ home management, Taping techniques, Therapeutic activities, Therapeutic exercises, Ultrasound, Vasopneumatic device  PT Plan: Skilled PT  PT Frequency: 2 times per week  Duration: 6 visits  Onset Date: 01/10/24  Certification Period Start Date: 04/10/24  Certification Period End Date: 07/09/24  Number of Treatments Authorized: 6  Rehab Potential: Good  Plan of Care Agreement: Patient    PT Assessment:    Patient is a 43 y.o. FEMALE with c/o L knee pain.   Patient is alert and oriented x 3.  Patient presents with medical diagnosis of L medial meniscus tear contributing to compensatory soft tissue dysfunction, pain, stiffness and weakness of the L knee.   Significant past medical history/past surgical history includes see above.    Skilled care is needed to progress the patient back to these activities without exacerbating symptoms.   Patient requires skilled PT services to address the problems identified and the individualized patient's goals as outlined in the problems and goals section of this evaluation.  A skilled PT is required to address these key impairments and to provide and progress with an appropriate home exercise program. Patient does not have any significant PMH influencing Rx and reports motivation to return to FUNCTIONAL ACTIVITY.   Patient demonstrates to be a good candidate for physical therapy with good rehab potential and verbalized a good understanding of HER diagnosis, prognosis and treatment.  Goals have been established and reviewed with the patient.      PT Assessment Results: Decreased strength, Decreased range of motion, Decreased  endurance, Impaired balance, Decreased mobility, Pain  Rehab Prognosis: Good  Evaluation/Treatment Tolerance: Patient limited by fatigue, Patient limited by pain    Complexity:  Low complexity evaluation  due to a 15 minute duration, a past medical history WITHOUT any personal factors and/or comorbidities that could impact the POC, examination of body systems completed on one to two elements, the patient presents with a stable condition, and clinical decision making using the LEFS was of low complexity.     Prognosis:  Rehab Prognosis: Good    Problem List  Activity Limitations, Decreased Functional Level, Decreased knowledge of HEP, Gait issues, Pain, Range of Motion/joint mobility issues, Strength, and Endurance    Impairments   IMPAIRED ROM LOWER BODY, IMPAIRED STRENGTH LOWER BODY, IMPAIRED GAIT, IMPAIRED BALANCE, IMPAIRED CORE STABILITY, INCREASED PAIN, IMPAIRED FUNCTIONAL ACTIVITY LEVEL, and IMPAIRED FUNCTIONAL MOVEMENT PATTERNS    Functional Limitations:  LIMITATIONS PERFORMING BASIC ADL'S, ISSUES WITH SLEEP, WORK ISSUES, PARTICIPATION IN HOBBIES, PARTICIPATION IN LEISURE ACTIVITIES, and PARTICIPATION IN HOME MANAGEMENT    General Visit Information:  Reason for Referral: PT Evaluate and Treat  Referred By: Dr. Ovi Asif  General Comment: [id number] (ICD-10-CM) - Left knee pain, unspecified [id number] (ICD-10-CM) - Acute medial meniscus tear, left, subsequent encounter    Pre-Cautions:  BRODYADI Fall Risk Score (The score of 4 or more indicates an increased risk of falling): 0     Medical Precautions:  (headaches, migraines, anxiety, acid reflux))     Reason for Visit:  PT Evaluate and Treat    Initial Evaluation:  Referred By: Dr. Ovi Asif    Insurance  Insurance reviewed  Name of Insurance:  [facility]  Visit No.  1  *(EVAL) Left knee pain, unspecified chronicity [M25.562]; Acute medial meniscus tear, left, sequela [S83.242S] $40 COPAY / $4000/8000 DED (DOES NOT APPLY) / 0% COINSU / 3760 OOP (1310  REMAIN) / 20V CY PA IS NOT REQ [email or web address] [id number] // Hilary confirmed 4/3/24 5:33pm     Subjective:    Current Episode  Date of Onset:  3 months ago  Mechanism of injury:  Patient reports having L knee pain for about the last 3 months (unknown mechanism of injury to her L knee, no prior L knee injury).  Patient reports trying to get a L knee MRI and it was denied secondary to needing to have PT first.    Pain Score:  Pain Assessment: 0-10  Pain Assessment  Pain Assessment: 0-10  Pain Score: 8 (10/10 at worst, 6/10 at best)  Pain Type: Acute pain  Pain Location: Knee  Pain Orientation: Left, Anterior  Pain Descriptors: Sharp, Throbbing  Pain Frequency: Constant/continuous  Pain Onset: Unable to tell  Pain Type: Acute pain  Pain Location: Knee  Pain Orientation: Left, Anterior  Pain Descriptors: Sharp, Throbbing  Pain Frequency: Constant/continuous    Better with:  ice    Worse with:  twisting, bending, sitting, standing, walking, stairs, squatting, lunging, kneeling,     Medical History/Surgical History:  Medical Precautions:  (headaches, migraines, anxiety, acid reflux)    Reviewed medical history form with patient (medications/allergies reviewed with patient).  Current Outpatient Medications   Medication Instructions   • clobetasol (Temovate) 0.05 % ointment Topical, 2 times daily   • dexlansoprazole (DEXILANT) 20 mg, oral, Daily before breakfast   • famciclovir (Famvir) 500 mg tablet TAKE 2 TABLET Every twelve hours PRN outbreak take 2 doses for a total of 4 tabs per episode then stop   • fluconazole (Diflucan) 100 mg tablet TAKE 1 TABLET (100 MG) BY MOUTH ONCE DAILY FOR 10 DAYS.   • fluticasone (Cutivate) 0.05 % cream APPLY ONCE A DAY TO RASH FOR TWO WEEKS AS NEEDED   • green tea leaf extract (Green Tea) 250 mg capsule    • loratadine (CLARITIN) 10 mg, oral, Daily RT   • metroNIDAZOLE (Metrogel) 0.75 % (37.5mg/5 gram) vaginal gel INSERT INTO THE VAGINA ONCE DAILY FOR 5 DAYS.   • multivitamin  tablet 1 tablet, oral, Daily   • naproxen (Naprosyn) 500 mg tablet    • omega 3-dha-epa-fish oil (Fish OiL) 1,000 mg (120 mg-180 mg) capsule oral   • pyridoxine (Vitamin B-6) 50 mg tablet oral   • ubrogepant (UBRELVY) 100 mg, oral, Daily PRN   • valACYclovir (VALTREX) 500 mg, oral, Daily     Radiology:  Exam Information    Status Exam Begun Exam Ended   Final 3/13/2024 15:38 3/13/2024 15:41     Study Result    Narrative & Impression   Interpreted By:  Shane Smith,   STUDY:  XR KNEE LEFT 4+ VIEWS; 3/13/2024 3:41 pm      INDICATION:  Signs/Symptoms:pain.      ACCESSION NUMBER(S):  [id number]      ORDERING CLINICIAN:  SHANE SMITH      FINDINGS:  Left knee x-ray AP lateral Merchant and bilateral flexion PA view:  Patient with some evidence of lateral tilt of the patella on the  Merchant view. No evidence of acute fractures or significant  degenerative changes.     Functional Assessment:  Level of [place]:  Level of [place]: Independent with ADLs and functional transfers    Work Status:  EMPLOYED  Patient Awareness:  Patient is aware of HER diagnosis and prognosis.  Social Support/History:  LIVES WITH FAMILY    Objective:    Weightbearing Status:  WBAT L LE    Skin:  skin intact over L knee    Palpation:   point tenderness over anteromedial/anterolateral joint line of knee, quadriceps tendon    Sensation:  Patient denies numbness/tingling of bilateral LOWER extremities.    Gait:  mild antalgic gait L LE    Lower Extremity Movement Testing:  Squat- increased L knee pain  Single leg stance  R- WNL's  L- increased L knee pain    ROM:  AROM  L knee 0-115 degrees extension/flexion  L knee AROM WNL's, R hip AROM WNL's, L hip AROM WNL's, R ankle/foot AROM WNL's, and L ankle/foot AROM WNL's    Strength:    L knee 4-/5 all planes, L hip 4/5 all planes  R knee 5/5 all planes, R hip 5/5 all planes, R ankle/foot 5/5 all planes, and L ankle/foot 5/5 all planes    Special Tests  not performed    Outcome measure  Lower  Extremity Funtional Score (LEFS): 17/80      Treatment  Time in clinic started at  3:30pm  Time in clinic ended at  4:12pm  Total time in clinic is . 42 minutes  Total timed code time is  38 minutes    Treatment Performed Today:.   PT Initial Evaluation, Vasopneumatic Compression/Game Ready, and Self-Care/Home Management HEP  Individual(s) Educated: Patient  Education Provided: Home Exercise Program  Diagnosis and Precautions: [id number] (ICD-10-CM) - Left knee pain, unspecified [id number] (ICD-10-CM) - Acute medial meniscus tear, left, subsequent encounter  Risk and Benefits Discussed with Patient/Caregiver/Other: yes  Patient/Caregiver Demonstrated Understanding: yes  Plan of Care Discussed and Agreed Upon: yes  Patient Response to Education: Patient/Caregiver Verbalized Understanding of Information, Patient/Caregiver Performed Return Demonstration of Exercises/Activities    Game Ready x 15 minutes L knee    Patient instructed in a home exercise program, has been given handouts for each of the exercises performed and was given another sheet instructing patient in the amount of reps to perform and the rodney to follow while doing the exercises     Access Code: [email or web address]  URL: [email or web address]  Date: 04/10/2024  Prepared by: Higinio العلي    Exercises  - Supine Active Straight Leg Raise  - 1 x daily - 3-4 x weekly - 2 sets - 10 reps - 5-10 hold  - Straight Leg Raise with External Rotation  - 1 x daily - 3-4 x weekly - 2 sets - 10 reps - 5-10 hold  - Seated Long Arc Quad  - 1 x daily - 3-4 x weekly - 2 sets - 10 reps - 5-10 hold  - Supine Knee Extension Strengthening  - 1 x daily - 3-4 x weekly - 2 sets - 10 reps - 5-10 hold  - Sidelying Hip Abduction  - 1 x daily - 3-4 x weekly - 2 sets - 10 reps - 5-10 hold         Current Participants as of 4/10/2024    Name Type Comments Contact Info    Ovi Asif MD Referring Provider  [phone]    Signature pending    Higinio OLSON  Zohra PT Physical Therapist  [phone]    Signature pending

## 2024-04-10 NOTE — PATIENT INSTRUCTIONS
Access Code: [email or web address]  URL: [email or web address]  Date: 04/10/2024  Prepared by: Higinio العلي    Exercises  - Supine Active Straight Leg Raise  - 1 x daily - 3-4 x weekly - 2 sets - 10 reps - 5-10 hold  - Straight Leg Raise with External Rotation  - 1 x daily - 3-4 x weekly - 2 sets - 10 reps - 5-10 hold  - Seated Long Arc Quad  - 1 x daily - 3-4 x weekly - 2 sets - 10 reps - 5-10 hold  - Supine Knee Extension Strengthening  - 1 x daily - 3-4 x weekly - 2 sets - 10 reps - 5-10 hold  - Sidelying Hip Abduction  - 1 x daily - 3-4 x weekly - 2 sets - 10 reps - 5-10 hold

## 2024-04-10 NOTE — PROGRESS NOTES
PT Initial Evaluation    Patient Name:  Mar Duque    MRN:  [id number]    :  1980    Today's Date:  04/10/24    Time Calculation  Start Time: 1530  Stop Time: 1612  Time Calculation (min): 42 min  PT Evaluation Time Entry  PT Evaluation (Low) Time Entry: 15  PT Therapeutic Procedures Time Entry  Self-Care/Home Mgmt Trainin  PT Modalities Time Entry  Vasopneumatic Devices Time Entry: 15    Informed Consent  Patient has been informed of all evaluation findings and treatment plans and agrees to participate in Physical Therapy services and plans as outlined.    Diagnosis:  Diagnosis and Precautions: QzyilynvdJ04.562 (ICD-10-CM) - Left knee pain, unspecified nnhhddewisB71.242D (ICD-10-CM) - Acute medial meniscus tear, left, subsequent encounter    Goals:   By the end of 6 visits patient will be able to do the following with < 1/10 L KNEE pain:    HEP:  Patient will consistently perform HER home exercise program for 20-30 minute sessions, 1-2x/day, 3-4 days/week independently by the end of 6 visits.    Basic ADL's:   Patient will perform bADL's/instrumental ADL's for 30 minutes moving between various closed kinetic chain postures.    ROM and Strength:  Patient will demonstrate 4+/5 L KNEE strength in all planes and WNL's L KNEE AROM in all planes to improve their ability to lift, stand, ambulate and perform basic ADL's.    Stair Negotiation:  Patient will be able to ambulate up/down stairs for 1-2 flights at a time.    Gait/Locomotion:  Patient will be able to ambulate for 30-60 minutes at a time.  Minimal to no gait deviation across level ground/stairs.  Patient will demonstrate no L KNEE pain with the following movements:  partial squat, lunge, forward/lateral step-up, HR, stepdown and SLS.    WORK:  Patient will return to WORK and perform normal WORK activities pain free.    Sleep:  Patient will sleep thru the night 4/7 nights/week.    Participation restrictions:  Increase LEFS to > or = to 40/80 for  increased functional ability.    Pain:  Decrease pain at worst to < or = to 1/10 for improved QOL and ability to sleep.    No point tenderness noted over the L anterior knee.     Plan of Care:      Treatment/Interventions: Aquatic therapy, Cryotherapy, Education/ Instruction, Electrical stimulation, Gait training, Manual therapy, Neuromuscular re-education, Self care/ home management, Taping techniques, Therapeutic activities, Therapeutic exercises, Ultrasound, Vasopneumatic device  PT Plan: Skilled PT  PT Frequency: 2 times per week  Duration: 6 visits  Onset Date: 01/10/24  Certification Period Start Date: 04/10/24  Certification Period End Date: 07/09/24  Number of Treatments Authorized: 6  Rehab Potential: Good  Plan of Care Agreement: Patient    PT Assessment:    Patient is a 43 y.o. FEMALE with c/o L knee pain.   Patient is alert and oriented x 3.  Patient presents with medical diagnosis of L medial meniscus tear contributing to compensatory soft tissue dysfunction, pain, stiffness and weakness of the L knee.   Significant past medical history/past surgical history includes see above.    Skilled care is needed to progress the patient back to these activities without exacerbating symptoms.   Patient requires skilled PT services to address the problems identified and the individualized patient's goals as outlined in the problems and goals section of this evaluation.  A skilled PT is required to address these key impairments and to provide and progress with an appropriate home exercise program. Patient does not have any significant PMH influencing Rx and reports motivation to return to FUNCTIONAL ACTIVITY.   Patient demonstrates to be a good candidate for physical therapy with good rehab potential and verbalized a good understanding of HER diagnosis, prognosis and treatment.  Goals have been established and reviewed with the patient.      PT Assessment Results: Decreased strength, Decreased range of motion,  Decreased endurance, Impaired balance, Decreased mobility, Pain  Rehab Prognosis: Good  Evaluation/Treatment Tolerance: Patient limited by fatigue, Patient limited by pain    Complexity:  Low complexity evaluation  due to a 15 minute duration, a past medical history WITHOUT any personal factors and/or comorbidities that could impact the POC, examination of body systems completed on one to two elements, the patient presents with a stable condition, and clinical decision making using the LEFS was of low complexity.     Prognosis:  Rehab Prognosis: Good    Problem List  Activity Limitations, Decreased Functional Level, Decreased knowledge of HEP, Gait issues, Pain, Range of Motion/joint mobility issues, Strength, and Endurance    Impairments   IMPAIRED ROM LOWER BODY, IMPAIRED STRENGTH LOWER BODY, IMPAIRED GAIT, IMPAIRED BALANCE, IMPAIRED CORE STABILITY, INCREASED PAIN, IMPAIRED FUNCTIONAL ACTIVITY LEVEL, and IMPAIRED FUNCTIONAL MOVEMENT PATTERNS    Functional Limitations:  LIMITATIONS PERFORMING BASIC ADL'S, ISSUES WITH SLEEP, WORK ISSUES, PARTICIPATION IN HOBBIES, PARTICIPATION IN LEISURE ACTIVITIES, and PARTICIPATION IN HOME MANAGEMENT    General Visit Information:  Reason for Referral: PT Evaluate and Treat  Referred By: Dr. Ovi Asif  General Comment: NbarjsmqvL12.562 (ICD-10-CM) - Left knee pain, unspecified kntjoajpgrV62.242D (ICD-10-CM) - Acute medial meniscus tear, left, subsequent encounter    Pre-Cautions:  BRODYADI Fall Risk Score (The score of 4 or more indicates an increased risk of falling): 0     Medical Precautions:  (headaches, migraines, anxiety, acid reflux))     Reason for Visit:  PT Evaluate and Treat    Initial Evaluation:  Referred By: Dr. Ovi Asif    Insurance  Insurance reviewed  Name of Insurance:  [facility]  Visit No.  1  *(EVAL) Left knee pain, unspecified chronicity [M25.562]; Acute medial meniscus tear, left, sequela [S83.242S] $40 COPAY / $4000/8000 DED (DOES NOT APPLY) / 0% COINSU / 7127  OOP (6503 REMAIN) / 20V CY PA IS NOT REQ [email or web address] [id number] // Hilary confirmed 4/3/24 5:33pm     Subjective:    Current Episode  Date of Onset:  3 months ago  Mechanism of injury:  Patient reports having L knee pain for about the last 3 months (unknown mechanism of injury to her L knee, no prior L knee injury).  Patient reports trying to get a L knee MRI and it was denied secondary to needing to have PT first.    Pain Score:  Pain Assessment: 0-10  Pain Assessment  Pain Assessment: 0-10  Pain Score: 8 (10/10 at worst, 6/10 at best)  Pain Type: Acute pain  Pain Location: Knee  Pain Orientation: Left, Anterior  Pain Descriptors: Sharp, Throbbing  Pain Frequency: Constant/continuous  Pain Onset: Unable to tell  Pain Type: Acute pain  Pain Location: Knee  Pain Orientation: Left, Anterior  Pain Descriptors: Sharp, Throbbing  Pain Frequency: Constant/continuous    Better with:  ice    Worse with:  twisting, bending, sitting, standing, walking, stairs, squatting, lunging, kneeling,     Medical History/Surgical History:  Medical Precautions:  (headaches, migraines, anxiety, acid reflux)    Reviewed medical history form with patient (medications/allergies reviewed with patient).  Current Outpatient Medications   Medication Instructions    clobetasol (Temovate) 0.05 % ointment Topical, 2 times daily    dexlansoprazole (DEXILANT) 20 mg, oral, Daily before breakfast    famciclovir (Famvir) 500 mg tablet TAKE 2 TABLET Every twelve hours PRN outbreak take 2 doses for a total of 4 tabs per episode then stop    fluconazole (Diflucan) 100 mg tablet TAKE 1 TABLET (100 MG) BY MOUTH ONCE DAILY FOR 10 DAYS.    fluticasone (Cutivate) 0.05 % cream APPLY ONCE A DAY TO RASH FOR TWO WEEKS AS NEEDED    green tea leaf extract (Green Tea) 250 mg capsule     loratadine (CLARITIN) 10 mg, oral, Daily RT    metroNIDAZOLE (Metrogel) 0.75 % (37.5mg/5 gram) vaginal gel INSERT INTO THE VAGINA ONCE DAILY FOR 5 DAYS.    multivitamin  tablet 1 tablet, oral, Daily    naproxen (Naprosyn) 500 mg tablet     omega 3-dha-epa-fish oil (Fish OiL) 1,000 mg (120 mg-180 mg) capsule oral    pyridoxine (Vitamin B-6) 50 mg tablet oral    ubrogepant (UBRELVY) 100 mg, oral, Daily PRN    valACYclovir (VALTREX) 500 mg, oral, Daily     Radiology:  Exam Information    Status Exam Begun Exam Ended   Final 3/13/2024 15:38 3/13/2024 15:41     Study Result    Narrative & Impression   Interpreted By:  Shane Smith,   STUDY:  XR KNEE LEFT 4+ VIEWS; 3/13/2024 3:41 pm      INDICATION:  Signs/Symptoms:pain.      ACCESSION NUMBER(S):  [id number]      ORDERING CLINICIAN:  SHANE SMITH      FINDINGS:  Left knee x-ray AP lateral Merchant and bilateral flexion PA view:  Patient with some evidence of lateral tilt of the patella on the  Merchant view. No evidence of acute fractures or significant  degenerative changes.     Functional Assessment:  Level of [place]:  Level of [place]: Independent with ADLs and functional transfers    Work Status:  EMPLOYED  Patient Awareness:  Patient is aware of HER diagnosis and prognosis.  Social Support/History:  LIVES WITH FAMILY    Objective:    Weightbearing Status:  WBAT L LE    Skin:  skin intact over L knee    Palpation:   point tenderness over anteromedial/anterolateral joint line of knee, quadriceps tendon    Sensation:  Patient denies numbness/tingling of bilateral LOWER extremities.    Gait:  mild antalgic gait L LE    Lower Extremity Movement Testing:  Squat- increased L knee pain  Single leg stance  R- WNL's  L- increased L knee pain    ROM:  AROM  L knee 0-115 degrees extension/flexion  R knee AROM WNL's, R hip AROM WNL's, L hip AROM WNL's, R ankle/foot AROM WNL's, and L ankle/foot AROM WNL's     Strength:    L knee 4-/5 all planes, L hip 4/5 all planes  R knee 5/5 all planes, R hip 5/5 all planes, R ankle/foot 5/5 all planes, and L ankle/foot 5/5 all planes    Special Tests  not performed    Outcome measure  Lower  Extremity Funtional Score (LEFS): 17/80      Treatment  Time in clinic started at  3:30pm  Time in clinic ended at  4:12pm  Total time in clinic is . 42 minutes  Total timed code time is  38 minutes    Treatment Performed Today:.   PT Initial Evaluation, Vasopneumatic Compression/Game Ready, and Self-Care/Home Management HEP  Individual(s) Educated: Patient  Education Provided: Home Exercise Program  Diagnosis and Precautions: UaxzqdncxG54.562 (ICD-10-CM) - Left knee pain, unspecified wmprxsfcqvX53.242D (ICD-10-CM) - Acute medial meniscus tear, left, subsequent encounter  Risk and Benefits Discussed with Patient/Caregiver/Other: yes  Patient/Caregiver Demonstrated Understanding: yes  Plan of Care Discussed and Agreed Upon: yes  Patient Response to Education: Patient/Caregiver Verbalized Understanding of Information, Patient/Caregiver Performed Return Demonstration of Exercises/Activities    Game Ready x 15 minutes L knee    Patient instructed in a home exercise program, has been given handouts for each of the exercises performed and was given another sheet instructing patient in the amount of reps to perform and the rodney to follow while doing the exercises     Access Code: [email or web address]  URL: [email or web address]  Date: 04/10/2024  Prepared by: Higinio العلي    Exercises  - Supine Active Straight Leg Raise  - 1 x daily - 3-4 x weekly - 2 sets - 10 reps - 5-10 hold  - Straight Leg Raise with External Rotation  - 1 x daily - 3-4 x weekly - 2 sets - 10 reps - 5-10 hold  - Seated Long Arc Quad  - 1 x daily - 3-4 x weekly - 2 sets - 10 reps - 5-10 hold  - Supine Knee Extension Strengthening  - 1 x daily - 3-4 x weekly - 2 sets - 10 reps - 5-10 hold  - Sidelying Hip Abduction  - 1 x daily - 3-4 x weekly - 2 sets - 10 reps - 5-10 hold

## 2024-04-17 ENCOUNTER — APPOINTMENT (OUTPATIENT)
Dept: PHYSICAL THERAPY | Facility: CLINIC | Age: 44
End: 2024-04-17
Payer: COMMERCIAL

## 2024-04-25 ENCOUNTER — TREATMENT (OUTPATIENT)
Dept: PHYSICAL THERAPY | Facility: CLINIC | Age: 44
End: 2024-04-25
Payer: COMMERCIAL

## 2024-04-25 DIAGNOSIS — S83.242D ACUTE MEDIAL MENISCAL TEAR, LEFT, SUBSEQUENT ENCOUNTER: ICD-10-CM

## 2024-04-25 DIAGNOSIS — M25.562 LEFT KNEE PAIN, UNSPECIFIED CHRONICITY: ICD-10-CM

## 2024-04-25 PROCEDURE — 97110 THERAPEUTIC EXERCISES: CPT | Mod: GP | Performed by: PHYSICAL THERAPIST

## 2024-04-25 ASSESSMENT — PAIN SCALES - GENERAL: PAINLEVEL_OUTOF10: 8

## 2024-04-25 ASSESSMENT — PAIN - FUNCTIONAL ASSESSMENT: PAIN_FUNCTIONAL_ASSESSMENT: 0-10

## 2024-04-25 ASSESSMENT — PAIN DESCRIPTION - DESCRIPTORS: DESCRIPTORS: SHARP;THROBBING

## 2024-04-25 NOTE — PROGRESS NOTES
PHYSICAL THERAPY TREATMENT    Patient name:  Mar Duque    MRN:  [id number]    :  1980    Today's Date:  24    Time Calculation  Start Time:   Stop Time:   Time Calculation (min): 33 min     PT Therapeutic Procedures Time Entry  Therapeutic Exercise Time Entry: 30     Referral by:  Referred By: Dr. Ovi Asif    Diagnoses:  Diagnosis and Precautions: ZcxrrhcqkA23.562 (ICD-10-CM) - Left knee pain, unspecified addgyedsxaV17.242D (ICD-10-CM) - Acute medial meniscus tear, left, subsequent encounter    Assessment/Plan:  Therapeutic exercise performed in order to improve L knee strength/ROM/mobility.  Patient requires increased tactile/verbal cues with exercise in order to reduce L knee stress/strain during the particular exercise performed.  Patient challenged with exercises performed in clinic secondary to L knee fatigue.   PT Assessment  PT Assessment Results: Decreased strength, Decreased range of motion, Decreased endurance, Impaired balance, Decreased mobility, Pain  Rehab Prognosis: Good  Evaluation/Treatment Tolerance: Patient limited by pain  OP PT Plan  PT Plan: Skilled PT  Rehab Potential: Good  Plan of Care Agreement: Patient    General Visit Information  PT  Visit  PT Received On: 24    General  Reason for Referral: PT Evaluate and Treat  Referred By: Dr. Ovi Asif  General Comment: CqzvixsqdK46.562 (ICD-10-CM) - Left knee pain, unspecified toaiztffwjH66.242D (ICD-10-CM) - Acute medial meniscus tear, left, subsequent encounter    Insurance  Insurance reviewed  Name of Insurance:  [facility]  Visit No.  2  Left knee pain, unspecified chronicity [M25.562]; Acute medial meniscus tear, left, sequela [S83.242S] $40 COPAY / $4000/8000 DED (DOES NOT APPLY) / 0% COINSU / 7150 OOP (6503 REMAIN) / 20V CY PA IS NOT REQ PER [email or web address] [id number] // Hilary confirmed 4/3/24 5:33pm     Subjective:  Patient reports that her L knee is feeling the same.    Precautions  STEADI Fall Risk  "Score (The score of 4 or more indicates an increased risk of falling): 0  Medical Precautions:  (headaches, migraines, anxiety, acid reflux)    Pain:  Pain Assessment  Pain Assessment: 0-10  Pain Score: 8  Pain Type: Acute pain  Pain Location: Knee  Pain Orientation: Left, Anterior  Pain Descriptors: Sharp, Throbbing  Pain Frequency: Constant/continuous    Treatments    Therapeutic Exercise  Therapeutic Exercise Performed: Yes  Therapeutic Exercise Activity 1: Recumbent bike x 8 minutes  Therapeutic Exercise Activity 2: standing L hamstring stretch 30\"x4  Therapeutic Exercise Activity 3: standing lunge stretch at steps 2 sets, 10 reps  Therapeutic Exercise Activity 4: Forward/lateral step-ups 4 inch step, 20 reps each  Therapeutic Exercise Activity 5: HR x 20 reps  Therapeutic Exercise Activity 6: supine bridges x 20 reps  Therapeutic Exercise Activity 7: supine SLR flexion x 20 reps    Patient deferred ice    Treatment  Time in clinic started at:   4:15pm  Time in clinic ended at:   4:48pm  Total time in clinic is:   33 minutes  Total timed code time is:  30 minutes    Treatment Performed Today:.   Therapeutic Exercise x 2 units  "

## 2024-04-29 ENCOUNTER — TREATMENT (OUTPATIENT)
Dept: PHYSICAL THERAPY | Facility: CLINIC | Age: 44
End: 2024-04-29
Payer: COMMERCIAL

## 2024-04-29 DIAGNOSIS — S83.242D ACUTE MEDIAL MENISCAL TEAR, LEFT, SUBSEQUENT ENCOUNTER: ICD-10-CM

## 2024-04-29 DIAGNOSIS — S83.242S ACUTE MEDIAL MENISCUS TEAR, LEFT, SEQUELA: ICD-10-CM

## 2024-04-29 DIAGNOSIS — M25.562 LEFT KNEE PAIN, UNSPECIFIED CHRONICITY: Primary | ICD-10-CM

## 2024-04-29 PROCEDURE — 97140 MANUAL THERAPY 1/> REGIONS: CPT | Mod: GP,CQ

## 2024-04-29 PROCEDURE — 97110 THERAPEUTIC EXERCISES: CPT | Mod: GP,CQ

## 2024-04-29 ASSESSMENT — PAIN - FUNCTIONAL ASSESSMENT: PAIN_FUNCTIONAL_ASSESSMENT: 0-10

## 2024-04-29 ASSESSMENT — PAIN SCALES - GENERAL: PAINLEVEL_OUTOF10: 8

## 2024-04-29 NOTE — PROGRESS NOTES
PHYSICAL THERAPY TREATMENT    Patient name:  Mar Duque    MRN:  [id number]    :  1980    Today's Date:  24    Time Calculation  Start Time: 445  Stop Time: 538  Time Calculation (min): 53 min     PT Therapeutic Procedures Time Entry  Manual Therapy Time Entry: 12  Therapeutic Exercise Time Entry: 35     Referral by:  Referred By: Dr. Ovi Asif    Diagnoses:  Diagnosis and Precautions: CbgbvhckqX07.562 (ICD-10-CM) - Left knee pain, unspecified mgsyvvewysZ48.242D (ICD-10-CM) - Acute medial meniscus tear, left, subsequent encounter    Assessment/Plan:  Therapeutic exercise performed in order to improve L knee strength/ROM/mobility.  No c/o during/ after tx. Decreased there ex secondary to increased pain and pt education to not do HEP if increased pain. Patient challenged with exercises performed in clinic secondary to L knee fatigue. Assess tape. Patient would benefit from P.T. to continue to address impairments in order to improve strength, flexibility, posture, and  to decrease symptoms and increase overall function.    PT Assessment  Evaluation/Treatment Tolerance: Patient limited by pain  OP PT Plan  PT Plan: Skilled PT    General Visit Information       General  Reason for Referral: PT Evaluate and Treat  Referred By: Dr. Ovi Asif  General Comment: JygjfmwviX18.562 (ICD-10-CM) - Left knee pain, unspecified nxqzrdeashF09.242D (ICD-10-CM) - Acute medial meniscus tear, left, subsequent encounter    Insurance  Insurance reviewed  Name of Insurance:  [facility]  Visit No.  3  Left knee pain, unspecified chronicity [M25.562]; Acute medial meniscus tear, left, sequela [S83.242S] $40 COPAY / $4000/8000 DED (DOES NOT APPLY) / 0% COINSU / 7150 OOP (6503 REMAIN) / 20V CY PA IS NOT REQ PER [email or web address] [id number] // Hilary confirmed 4/3/24 5:33pm     Subjective:  Patient reports that her L knee is feeling the same.    Precautions  STEADI Fall Risk Score (The score of 4 or more  "indicates an increased risk of falling): 0  Medical Precautions:  (headaches, migraines, anxiety, acid reflux)    Pain:  Pain Assessment  Pain Assessment: 0-10  Pain Score: 8  Objective:  L patella lat tilt and lat tracing noted.    Pt reports B hip flexor pain therefore DC SLRs and added glute strengthening.    Treatments  Recumbent bike x 8 minutes NT  longsit IR L hamstring stretch 30\"x3   Forward/lateral step-ups 4 inch step, 20 reps each HOLD  HR x 20 reps NT  supine bridges 5\"x15 reps  Clamshells x15  supine VMO flexion x 2' NMES  QS NMES w/towel toll x10'  SAQ w/BS NMES 0# x3'  L hip flexor stretch 3x30\"  Includes NMES to L VMO w/ther ex.      Manual:  STM/ TPR to L medial/ distal hamstring.  Includes TPR to L hip flexor to decrease tightness.  L leukotape to pull medial and decrease tilt.       HEP Issued and reviewed written HEP of:   Access Code: [email or web address]  URL: [email or web address]  Date: 04/10/2024     Exercises  - Supine Active Straight Leg Raise  - 1 x daily - 3-4 x weekly - 2 sets - 10 reps - 5-10 hold  - Straight Leg Raise with External Rotation  - 1 x daily - 3-4 x weekly - 2 sets - 10 reps - 5-10 hold  - Seated Long Arc Quad  - 1 x daily - 3-4 x weekly - 2 sets - 10 reps - 5-10 hold  - Supine Knee Extension Strengthening  - 1 x daily - 3-4 x weekly - 2 sets - 10 reps - 5-10 hold  - Sidelying Hip Abduction  - 1 x daily - 3-4 x weekly - 2 sets - 10 reps - 5-10 hold      Goals:   By the end of 6 visits patient will be able to do the following with < 1/10 L KNEE pain:     HEP:  Patient will consistently perform HER home exercise program for 20-30 minute sessions, 1-2x/day, 3-4 days/week independently by the end of 6 visits.     Basic ADL's:   Patient will perform bADL's/instrumental ADL's for 30 minutes moving between various closed kinetic chain postures.     ROM and Strength:  Patient will demonstrate 4+/5 L KNEE strength in all planes and WNL's L KNEE AROM in all planes to " improve their ability to lift, stand, ambulate and perform basic ADL's.     Stair Negotiation:  Patient will be able to ambulate up/down stairs for 1-2 flights at a time.     Gait/Locomotion:  Patient will be able to ambulate for 30-60 minutes at a time.  Minimal to no gait deviation across level ground/stairs.  Patient will demonstrate no L KNEE pain with the following movements:  partial squat, lunge, forward/lateral step-up, HR, stepdown and SLS.     WORK:  Patient will return to WORK and perform normal WORK activities pain free.     Sleep:  Patient will sleep thru the night 4/7 nights/week.     Participation restrictions:  Increase LEFS to > or = to 40/80 for increased functional ability.     Pain:  Decrease pain at worst to < or = to 1/10 for improved QOL and ability to sleep.     No point tenderness noted over the L anterior knee.

## 2024-05-02 ENCOUNTER — TREATMENT (OUTPATIENT)
Dept: PHYSICAL THERAPY | Facility: CLINIC | Age: 44
End: 2024-05-02
Payer: COMMERCIAL

## 2024-05-02 DIAGNOSIS — S83.242D ACUTE MEDIAL MENISCAL TEAR, LEFT, SUBSEQUENT ENCOUNTER: ICD-10-CM

## 2024-05-02 DIAGNOSIS — M25.562 LEFT KNEE PAIN, UNSPECIFIED CHRONICITY: ICD-10-CM

## 2024-05-02 PROCEDURE — 97110 THERAPEUTIC EXERCISES: CPT | Mod: GP | Performed by: PHYSICAL THERAPIST

## 2024-05-02 ASSESSMENT — PAIN SCALES - GENERAL: PAINLEVEL_OUTOF10: 8

## 2024-05-02 ASSESSMENT — PAIN - FUNCTIONAL ASSESSMENT: PAIN_FUNCTIONAL_ASSESSMENT: 0-10

## 2024-05-02 NOTE — PROGRESS NOTES
PHYSICAL THERAPY TREATMENT    Patient name:  Mar Duque    MRN:  [id number]    :  1980    Today's Date:  24    Time Calculation  Start Time: 165  Stop Time: 1730  Time Calculation (min): 31 min     PT Therapeutic Procedures Time Entry  Therapeutic Exercise Time Entry: 31     Referral by:  Referred By: Dr. Ovi Asif    Diagnoses:  Diagnosis and Precautions: MvnvqzhkqH24.562 (ICD-10-CM) - Left knee pain, unspecified dkllpisevmR57.242D (ICD-10-CM) - Acute medial meniscus tear, left, subsequent encounter    Assessment/Plan:  Therapeutic exercise performed in order to improve L knee strength/ROM/mobility.  Patient requires increased tactile/verbal cues with exercise in order to reduce L knee stress/strain during the particular exercise performed.  Patient challenged with exercises performed in clinic secondary to L knee fatigue/increased pain.  Patient demonstrates a poor overall tolerance with therapy thus far due to elevated knee pain.  PT Assessment  PT Assessment Results: Decreased strength, Decreased range of motion, Decreased endurance, Impaired balance, Decreased mobility, Pain  Rehab Prognosis: Good  Evaluation/Treatment Tolerance: Patient limited by pain  OP PT Plan  PT Plan: Skilled PT  Rehab Potential: Fair  Plan of Care Agreement: Patient  Re-Evaluation next visit.    General Visit Information  PT  Visit  PT Received On: 24    General  Reason for Referral: PT Evaluate and Treat  Referred By: Dr. Ovi Asif  General Comment: NapzbwlyiV98.562 (ICD-10-CM) - Left knee pain, unspecified ocefbudymyY57.242D (ICD-10-CM) - Acute medial meniscus tear, left, subsequent encounter    Insurance  Insurance reviewed  [facility]  Visit No.  4  Left knee pain, unspecified chronicity [M25.562]; Acute medial meniscus tear, left, sequela [S83.242S] $40 COPAY / $4000/8000 DED (DOES NOT APPLY) / 0% COINSU / 7150 OOP (6503 REMAIN) / 20V CY PA IS NOT REQ PER [email or web address] [id number]  // Hilary confirmed 4/3/24 5:33pm     Subjective:  Patient reports that her L knee is feeling the same, maybe even worse.    Precautions  STEADI Fall Risk Score (The score of 4 or more indicates an increased risk of falling): 0  Medical Precautions:  (headaches, migraines, anxiety, acid reflux)    Pain:  Pain Assessment  Pain Assessment: 0-10  Pain Score: 8  Pain Type: Acute pain  Pain Location: Knee  Pain Orientation: Left, Anterior    Treatments    Therapeutic Exercise  Therapeutic Exercise Performed: Yes  Therapeutic Exercise Activity 1: Recumbent bike x 8 minutes  Therapeutic Exercise Activity 2: supine SLR flexion x 20 reps  Therapeutic Exercise Activity 3: supine SLR flexion with ER x 20 reps  Therapeutic Exercise Activity 4: supine bridges x 20 reps  Therapeutic Exercise Activity 5: supine SAQ x 30 reps  Therapeutic Exercise Activity 6: seated LAQ x 30 reps  Therapeutic Exercise Activity 7: Forward/lateral step-ups 4 inch step, 20 reps each  Therapeutic Exercise Activity 8: attempted lunges and held secondary to increased pain  Therapeutic Exercise Activity 9: Mini-squats 2 sets, 10 reps  Therapeutic Exercise Activity 10: HR x 20 reps    Patient deferred ice    Treatment  Time in clinic started at:   4:59pm  Time in clinic ended at:   5:30pm  Total time in clinic is:   31 minutes  Total timed code time is:   31 minutes    Treatment Performed Today:.   Therapeutic Exercise x 2 units

## 2024-05-06 ENCOUNTER — APPOINTMENT (OUTPATIENT)
Dept: PHYSICAL THERAPY | Facility: CLINIC | Age: 44
End: 2024-05-06
Payer: COMMERCIAL

## 2024-05-09 ENCOUNTER — TREATMENT (OUTPATIENT)
Dept: PHYSICAL THERAPY | Facility: CLINIC | Age: 44
End: 2024-05-09
Payer: COMMERCIAL

## 2024-05-09 DIAGNOSIS — S83.242D ACUTE MEDIAL MENISCAL TEAR, LEFT, SUBSEQUENT ENCOUNTER: ICD-10-CM

## 2024-05-09 DIAGNOSIS — M25.562 LEFT KNEE PAIN, UNSPECIFIED CHRONICITY: ICD-10-CM

## 2024-05-09 PROCEDURE — 97110 THERAPEUTIC EXERCISES: CPT | Mod: GP | Performed by: PHYSICAL THERAPIST

## 2024-05-09 ASSESSMENT — PAIN SCALES - GENERAL: PAINLEVEL_OUTOF10: 8

## 2024-05-09 ASSESSMENT — PAIN - FUNCTIONAL ASSESSMENT: PAIN_FUNCTIONAL_ASSESSMENT: 0-10

## 2024-05-09 NOTE — PROGRESS NOTES
PHYSICAL THERAPY TREATMENT    Patient name:  Mar Duque    MRN:  [id number]    :  1980    Today's Date:  24    Time Calculation  Start Time:   Stop Time: 1700  Time Calculation (min): 13 min     PT Therapeutic Procedures Time Entry  Therapeutic Exercise Time Entry: 13     Referral by:  Referred By: Dr. Ovi Asif    Diagnoses:  Diagnosis and Precautions: [id number] (ICD-10-CM) - Left knee pain, unspecified [id number] (ICD-10-CM) - Acute medial meniscus tear, left, subsequent encounter    Goals:   By the end of 6 visits patient will be able to do the following with < 1/10 L KNEE pain:    HEP:  Patient will consistently perform HER home exercise program for 20-30 minute sessions, 1-2x/day, 3-4 days/week independently by the end of 6 visits.  Minimal change    Basic ADL's:   Patient will perform bADL's/instrumental ADL's for 30 minutes moving between various closed kinetic chain postures.  No change    ROM and Strength:  Patient will demonstrate 4+/5 L KNEE strength in all planes and WNL's L KNEE AROM in all planes to improve their ability to lift, stand, ambulate and perform basic ADL's.  No change    Stair Negotiation:  Patient will be able to ambulate up/down stairs for 1-2 flights at a time.  No change    Gait/Locomotion:  Patient will be able to ambulate for 30-60 minutes at a time.  No change  Minimal to no gait deviation across level ground/stairs.  Patient will demonstrate no L KNEE pain with the following movements:  partial squat, lunge, forward/lateral step-up, HR, stepdown and SLS.    WORK:  Patient will return to WORK and perform normal WORK activities pain free.  No change    Sleep:  Patient will sleep thru the night 4/7 nights/week.  No change    Participation restrictions:  Increase LEFS to > or = to 40/80 for increased functional ability.  Minimal change    Pain:  Decrease pain at worst to < or = to 1/10 for improved QOL and ability to sleep.  No change    No point  tenderness noted over the L anterior knee.  No change    Assessment/Plan:  Patient comes into clinic today for PT Re-Evaluation secondary to complaints of L knee pain.  Patient demonstrates no-minimal overall change with regards to her L knee rehab due to complaints of elevated pain levels.  Due to minimal overall functional change due to elevated pain levels, patient to be discharged from PT at this time.  Recommend further L knee imaging (L knee MRI) and then follow-up with MD afterwards.  PT Assessment  PT Assessment Results: Decreased strength, Decreased range of motion, Decreased endurance, Impaired balance, Decreased mobility, Pain  Rehab Prognosis: Fair  Evaluation/Treatment Tolerance: Patient limited by fatigue, Patient limited by pain  OP PT Plan  PT Plan: No Additional PT interventions required at this time  PT Frequency:  (discharge)  Duration: discharge  Rehab Potential: Fair  Plan of Care Agreement: Patient    General Visit Information  PT  Visit  PT Received On: 05/09/24    General  Reason for Referral: PT Evaluate and Treat  Referred By: Dr. Ovi Asif  General Comment: [id number] (ICD-10-CM) - Left knee pain, unspecified [id number] (ICD-10-CM) - Acute medial meniscus tear, left, subsequent encounter    Insurance  Insurance reviewed  Name of Insurance:  [facility]  Visit No.  5  Left knee pain, unspecified chronicity [M25.562]; Acute medial meniscus tear, left, sequela [S83.242S] $40 COPAY / $4000/8000 DED (DOES NOT APPLY) / 0% COINSU / 7150 OOP (6503 REMAIN) / 20V CY PA IS NOT REQ PER [email or web address] [id number] // Hilary confirmed 4/3/24 5:33pm     Subjective:  Patient comes into clinic today for PT Re-Evaluation secondary to complaints of L knee pain.  Patient reports that overall her L knee pain feels the same to worse.  Patient to follow-up with MD after MRI approval.    Precautions  STEADI Fall Risk Score (The score of 4 or more indicates an increased risk of falling): 0  Medical  Precautions:  (headaches, migraines, anxiety, acid reflux)    Pain:  Pain Assessment  Pain Assessment: 0-10  Pain Score: 8 (10/10 worst, 7/10 least)  Pain Type: Acute pain  Pain Location: Knee  Pain Orientation: Left, Anterior, Inner  Objective:    Weightbearing Status:  WBAT L LE    Skin:  skin intact over L knee    Palpation:   point tenderness over anteromedial/anterolateral joint line of knee, quadriceps tendon    Swelling:  swelling noted over L lateral quadriceps, puffiness noted over anteromedial joint line L knee    Sensation:  Patient denies numbness/tingling of bilateral LOWER extremities.    Gait:  mild antalgic gait L LE    Lower Extremity Movement Testing:  Squat- increased L knee pain  Lunge- increased L knee pain    ROM:  AROM  L knee 0-100 degrees extension/flexion (previously L knee 0-115 degrees)    Strength:    L knee 4-/5 all planes with pain    Outcome measure  Lower Extremity Funtional Score (LEFS): 21/80    Treatments    Therapeutic Exercise  Therapeutic Exercise Performed: Yes  Therapeutic Exercise Activity 1: Re-Evaluation    Treatment  Time in clinic started at:   4:47pm  Time in clinic ended at:   5pm  Total time in clinic is:   13 minutes  Total timed code time is:   13 minutes    Treatment Performed Today:.   Therapeutic Exercise x 1 unit

## 2024-05-13 ENCOUNTER — PROCEDURE VISIT (OUTPATIENT)
Dept: NEUROLOGY | Facility: CLINIC | Age: 44
End: 2024-05-13
Payer: COMMERCIAL

## 2024-05-13 DIAGNOSIS — G43.709 CHRONIC MIGRAINE WITHOUT AURA WITHOUT STATUS MIGRAINOSUS, NOT INTRACTABLE: Primary | ICD-10-CM

## 2024-05-13 PROCEDURE — 64615 CHEMODENERV MUSC MIGRAINE: CPT | Performed by: STUDENT IN AN ORGANIZED HEALTH CARE EDUCATION/TRAINING PROGRAM

## 2024-05-13 NOTE — PROGRESS NOTES
Neurology Botulinum Injection    Subjective   Mar Duque is an 43 y.o. female here for medical botulinum injection for Chronic migraine without aura without status migrainosus, not intractable [G43.709].     No change in headache frequency, severity, or phenotype. Having 25/30 HA days per month. Would like to proceed with Botox today.     Botox    Date/Time: 5/13/2024 3:51 PM    Performed by: Hoang Sylvester DO  Authorized by: Hoang Sylvester DO    Procedure specific details:      Procedure Note: PREEMPT Botox    Indications: Chronic Migraine    Informed consent was obtained (explaining the procedure and risks and benefits of procedure) from patient: the signed consent form was placed in the medical record.  A time out was completed, verifying correct patient, procedure,site, positioning, and implants or special equipment.    200 units of OnabotulinumtoxinA were reconstituted with saline. Sites of injection were identified via PREEMPT protocol and cleaned with alcohol pads. Using a 30 gauge needle, patient received following injections:    5 units in procerus  5 units in each left and right   10 units divided between 2 sites of L frontalis  10 units divided between 2 sites of R frontalis  20 units divided between 4 sites of L temporalis  20 units divided between 4 sites of R temporalis  15 units divided between 3 sites of L occipitalis  15 units divided between 3 sites of R occipitalis  10 units divided between 2 sites of L paracervical muscles  10 units divided between 2 sites of R paracervical muscles  15 units divided between 3 sites of L trapezius  15 units divided between 3 sites of R trapezius    Additional Sites:  none    Used: 155u  Wasted: 45u    There were no complications. Patient was comfortable and left without complaint.     OnabotulinumtoxinA  Lot #: [id number]  Exp: 5/2026

## 2024-05-14 ENCOUNTER — SPECIALTY PHARMACY (OUTPATIENT)
Dept: PHARMACY | Facility: CLINIC | Age: 44
End: 2024-05-14

## 2024-05-14 PROCEDURE — RXMED WILLOW AMBULATORY MEDICATION CHARGE

## 2024-05-16 ENCOUNTER — OFFICE VISIT (OUTPATIENT)
Dept: ORTHOPEDIC SURGERY | Facility: CLINIC | Age: 44
End: 2024-05-16
Payer: COMMERCIAL

## 2024-05-16 DIAGNOSIS — S83.242S ACUTE MEDIAL MENISCUS TEAR, LEFT, SEQUELA: ICD-10-CM

## 2024-05-16 PROCEDURE — 99213 OFFICE O/P EST LOW 20 MIN: CPT | Performed by: ORTHOPAEDIC SURGERY

## 2024-05-16 NOTE — PROGRESS NOTES
History of Present Illness:    patient returns today for evaluation of her ongoing left knee problems including mechanical symptoms such as catching, locking and giving out.  She notes that stairs and lunges are profoundly painful for her and she is having significant difficulty with her activities of daily living.  She states that her MRI was denied due to lack of physical therapy, she has since completed 5 sessions of physical therapy and had significant difficulty with ambulation after the fact and feels this could have caused more damage.  She is inquiring about MRI today.    Review of Systems   GENERAL: Negative for malaise, significant weight loss, fever  MUSCULOSKELETAL: see HPI  NEURO:  Negative    Physical Examination:  Left knee:  Skin healthy and intact  No gross swelling or ecchymosis  No varus malalignment  No valgus malalignment   No effusion  ROM:  Full flexion   Full extension  No pain with internal rotation of the hip     Mild tenderness to palpation over medial joint line  No tenderness to palpation over lateral joint line  No laxity to valgus stress  No laxity to varus stress  Negative Lachman´s test  Negative anterior drawer test  Negative posterior drawer test  Positive Sheba´s test     Neurovascular exam normal distally    Imaging:  Deferred    Assessment:   Patient with left knee pain with mechanical symptoms concern for internal derangement or chondral flap    Plan:  We reviewed the patient that our plan is the same as previous appointments and we recommend MRI for further evaluation.  Now that she has completed formal physical therapy trials we reviewed with the patient that this is likely not related to a muscular problem or tracking issue and this could be a true internal derangement/mechanical malformation.  We would encourage anti-inflammatories, rest ice and compression as well as activity modification for the time being until we know more answers.  Follow-up after MRI to discuss  treatment options.    Rick Blunt PA-C    In a face to face encounter, I evaluated the patient and performed a physical examination, discussed pertinent diagnostic studies if indicated and discussed diagnosis and management strategies with both the patient and physician assistant / nurse practitioner.  I reviewed the PA/NP's note and agree with the documented findings and plan of care.        Ovi Asif MD

## 2024-05-21 ENCOUNTER — SPECIALTY PHARMACY (OUTPATIENT)
Dept: PHARMACY | Facility: CLINIC | Age: 44
End: 2024-05-21

## 2024-05-22 ENCOUNTER — PHARMACY VISIT (OUTPATIENT)
Dept: PHARMACY | Facility: CLINIC | Age: 44
End: 2024-05-22
Payer: COMMERCIAL

## 2024-05-24 ENCOUNTER — APPOINTMENT (OUTPATIENT)
Dept: RADIOLOGY | Facility: CLINIC | Age: 44
End: 2024-05-24
Payer: COMMERCIAL

## 2024-05-31 ENCOUNTER — APPOINTMENT (OUTPATIENT)
Dept: NEUROLOGY | Facility: CLINIC | Age: 44
End: 2024-05-31
Payer: COMMERCIAL

## 2024-06-06 ENCOUNTER — OFFICE VISIT (OUTPATIENT)
Dept: ORTHOPEDIC SURGERY | Facility: CLINIC | Age: 44
End: 2024-06-06
Payer: COMMERCIAL

## 2024-06-06 DIAGNOSIS — S83.242S ACUTE MEDIAL MENISCUS TEAR, LEFT, SEQUELA: Primary | ICD-10-CM

## 2024-06-06 PROBLEM — M22.42 CHONDROMALACIA PATELLAE, LEFT KNEE: Status: ACTIVE | Noted: 2024-06-06

## 2024-06-06 PROCEDURE — 1036F TOBACCO NON-USER: CPT | Performed by: ORTHOPAEDIC SURGERY

## 2024-06-06 PROCEDURE — 99214 OFFICE O/P EST MOD 30 MIN: CPT | Performed by: ORTHOPAEDIC SURGERY

## 2024-06-06 NOTE — PROGRESS NOTES
History of Present Illness:   Patient returns today to review MRI.  The patient endorses persistent knee pain and persistent mechanical symptoms including locking, catching, and giving out.  These issues are refractory to multiple non-surgical treatments.    Review of Systems   GENERAL: Negative for malaise, significant weight loss, fever  MUSCULOSKELETAL: See HPI  NEURO:  Negative for numbness / tingling     Physical Examination:  Right Knee:  Skin healthy and intact  No gross varus or valgus alignment   Range of motion: no major deficits   Tenderness to palpation over joint line: lateral joint line  No laxity to valgus stress  No laxity to varus stress  Negative Lachman´s test  Negative anterior drawer test  Negative posterior drawer test  Positive Sheba´s test  Neurovascular exam normal distally    Imaging  MRI: Mild patellofemoral arthritis, focal full-thickness chondral defect with concern for loose fragment of cartilage in the defect    Assessment:    Patient with a right knee full-thickness chondral defect lateral femoral condyle with mechanical symptoms    Plan:  We discussed arthroscopy versus nonsurgical treatment for the patient´s knee.  We reviewed the procedure risk benefits and anticipated recovery.    We discussed the possibility of larger surgery such as osteochondral transplant or arthroplasty down the road pending the appearance of the patella.    Overall she is feels multimodality is a concern for an unstable chondral fragment based on her MRI and fluid circumferentially around it.

## 2024-06-06 NOTE — H&P
History Of Present Illness  Mar Duque is a 43 y.o. female presenting with left knee pain and mechanical symptoms.     Past Medical History  She has no past medical history on file.    Surgical History  She has no past surgical history on file.     Social History  She reports that she has never smoked. She has never used smokeless tobacco. She reports current alcohol use. She reports that she does not use drugs.    Family History  Family History   Problem Relation Name Age of Onset    Hypertension Mother      Hypothyroidism Mother      Diabetes Father      Hypertension Father      Hypertension Other Sibling     Migraines Neg Hx          Allergies  Iodine, Shellfish containing products, and Shellfish derived    Review of Systems CONSTITUTIONAL: Denies weight loss, fever and chills.    - HEENT: Denies changes in vision and hearing.    - RESPIRATORY: Denies SOB and cough.    - CV: Denies palpitations and CP.    - GI: Denies abdominal pain, nausea, vomiting and diarrhea.    - : Denies dysuria and urinary frequency.    - MSK: See physical exam findings     - SKIN: Denies rash and pruritus.    - NEUROLOGICAL: Denies headache and syncope.    - PSYCHIATRIC: Denies recent changes in mood. Denies anxiety and depression.       Physical Exam  - GENERAL: Alert and oriented x 3. No acute distress. Well-nourished.    - EYES: EOMI. Anicteric.    - HENT: Moist mucous membranes. No scleral icterus. No cervical lymphadenopathy.    - LUNGS: Clear to auscultation bilaterally. No accessory muscle use.    - CARDIOVASCULAR: Regular rate and rhythm. No murmur. No JVD.    - ABDOMEN: Soft, non-tender and non-distended. No palpable masses.    - EXTREMITIES: Positive Sneed's exam    - NEUROLOGIC: No focal neurological deficits. CN II-XII grossly intact, but not individually tested.    - PSYCHIATRIC: Cooperative. Appropriate mood and affect.     Last Recorded Vitals  There were no vitals taken for this visit.    Relevant  Results      Scheduled medications    Continuous medications    PRN medications    No results found for this or any previous visit (from the past 24 hour(s)).    Assessment/Plan   There are no diagnoses linked to this encounter.    Discussed left knee arthroscopic chondroplasty of the femoral condyle, patient agreeable.       I spent 10 minutes in the professional and overall care of this patient.      Rick Blunt PA-C

## 2024-06-19 ENCOUNTER — TELEPHONE (OUTPATIENT)
Dept: NEUROLOGY | Facility: CLINIC | Age: 44
End: 2024-06-19
Payer: COMMERCIAL

## 2024-06-26 RX ORDER — SODIUM CHLORIDE, SODIUM LACTATE, POTASSIUM CHLORIDE, CALCIUM CHLORIDE 600; 310; 30; 20 MG/100ML; MG/100ML; MG/100ML; MG/100ML
100 INJECTION, SOLUTION INTRAVENOUS CONTINUOUS
Status: CANCELLED | OUTPATIENT
Start: 2024-06-26

## 2024-06-26 RX ORDER — CEFAZOLIN SODIUM 2 G/100ML
2 INJECTION, SOLUTION INTRAVENOUS ONCE
Status: CANCELLED | OUTPATIENT
Start: 2024-06-26 | End: 2024-06-26

## 2024-06-27 ENCOUNTER — ANESTHESIA EVENT (OUTPATIENT)
Dept: OPERATING ROOM | Facility: HOSPITAL | Age: 44
End: 2024-06-27
Payer: COMMERCIAL

## 2024-06-28 ENCOUNTER — ANESTHESIA (OUTPATIENT)
Dept: OPERATING ROOM | Facility: HOSPITAL | Age: 44
End: 2024-06-28
Payer: COMMERCIAL

## 2024-06-28 ENCOUNTER — HOSPITAL ENCOUNTER (OUTPATIENT)
Facility: HOSPITAL | Age: 44
Setting detail: OUTPATIENT SURGERY
Discharge: HOME | End: 2024-06-28
Attending: ORTHOPAEDIC SURGERY | Admitting: ORTHOPAEDIC SURGERY
Payer: COMMERCIAL

## 2024-06-28 VITALS
HEIGHT: 69 IN | WEIGHT: 175 LBS | HEART RATE: 72 BPM | TEMPERATURE: 96.8 F | RESPIRATION RATE: 14 BRPM | DIASTOLIC BLOOD PRESSURE: 77 MMHG | SYSTOLIC BLOOD PRESSURE: 121 MMHG | OXYGEN SATURATION: 100 % | BODY MASS INDEX: 25.92 KG/M2

## 2024-06-28 DIAGNOSIS — M22.42 CHONDROMALACIA PATELLAE, LEFT KNEE: ICD-10-CM

## 2024-06-28 DIAGNOSIS — Z01.818 PREOP TESTING: ICD-10-CM

## 2024-06-28 DIAGNOSIS — G89.18 POST-OPERATIVE PAIN: Primary | ICD-10-CM

## 2024-06-28 LAB
ALBUMIN SERPL BCP-MCNC: 4.1 G/DL (ref 3.4–5)
ALP SERPL-CCNC: 51 U/L (ref 33–110)
ALT SERPL W P-5'-P-CCNC: 12 U/L (ref 7–45)
ANION GAP SERPL CALC-SCNC: 11 MMOL/L (ref 10–20)
AST SERPL W P-5'-P-CCNC: 18 U/L (ref 9–39)
BASOPHILS # BLD AUTO: 0.04 X10*3/UL (ref 0–0.1)
BASOPHILS NFR BLD AUTO: 0.8 %
BILIRUB SERPL-MCNC: 0.3 MG/DL (ref 0–1.2)
BUN SERPL-MCNC: 16 MG/DL (ref 6–23)
CALCIUM SERPL-MCNC: 9.2 MG/DL (ref 8.6–10.3)
CHLORIDE SERPL-SCNC: 104 MMOL/L (ref 98–107)
CO2 SERPL-SCNC: 29 MMOL/L (ref 21–32)
CREAT SERPL-MCNC: 0.78 MG/DL (ref 0.5–1.05)
EGFRCR SERPLBLD CKD-EPI 2021: >90 ML/MIN/1.73M*2
EOSINOPHIL # BLD AUTO: 0.17 X10*3/UL (ref 0–0.7)
EOSINOPHIL NFR BLD AUTO: 3.3 %
ERYTHROCYTE [DISTWIDTH] IN BLOOD BY AUTOMATED COUNT: 13.1 % (ref 11.5–14.5)
GLUCOSE SERPL-MCNC: 98 MG/DL (ref 74–99)
HCG UR QL IA.RAPID: NEGATIVE
HCT VFR BLD AUTO: 36.1 % (ref 36–46)
HGB BLD-MCNC: 12.1 G/DL (ref 12–16)
IMM GRANULOCYTES # BLD AUTO: 0.01 X10*3/UL (ref 0–0.7)
IMM GRANULOCYTES NFR BLD AUTO: 0.2 % (ref 0–0.9)
INR PPP: 1 (ref 0.9–1.1)
LYMPHOCYTES # BLD AUTO: 2.18 X10*3/UL (ref 1.2–4.8)
LYMPHOCYTES NFR BLD AUTO: 41.8 %
MCH RBC QN AUTO: 30.2 PG (ref 26–34)
MCHC RBC AUTO-ENTMCNC: 33.5 G/DL (ref 32–36)
MCV RBC AUTO: 90 FL (ref 80–100)
MONOCYTES # BLD AUTO: 0.33 X10*3/UL (ref 0.1–1)
MONOCYTES NFR BLD AUTO: 6.3 %
NEUTROPHILS # BLD AUTO: 2.48 X10*3/UL (ref 1.2–7.7)
NEUTROPHILS NFR BLD AUTO: 47.6 %
NRBC BLD-RTO: 0 /100 WBCS (ref 0–0)
PLATELET # BLD AUTO: 200 X10*3/UL (ref 150–450)
POTASSIUM SERPL-SCNC: 3.6 MMOL/L (ref 3.5–5.3)
PROT SERPL-MCNC: 6.5 G/DL (ref 6.4–8.2)
PROTHROMBIN TIME: 11.4 SECONDS (ref 9.8–12.8)
RBC # BLD AUTO: 4.01 X10*6/UL (ref 4–5.2)
SODIUM SERPL-SCNC: 140 MMOL/L (ref 136–145)
WBC # BLD AUTO: 5.2 X10*3/UL (ref 4.4–11.3)

## 2024-06-28 PROCEDURE — 84075 ASSAY ALKALINE PHOSPHATASE: CPT | Performed by: ORTHOPAEDIC SURGERY

## 2024-06-28 PROCEDURE — 2500000004 HC RX 250 GENERAL PHARMACY W/ HCPCS (ALT 636 FOR OP/ED): Performed by: ANESTHESIOLOGIST ASSISTANT

## 2024-06-28 PROCEDURE — 81025 URINE PREGNANCY TEST: CPT | Performed by: ORTHOPAEDIC SURGERY

## 2024-06-28 PROCEDURE — 85610 PROTHROMBIN TIME: CPT | Performed by: ORTHOPAEDIC SURGERY

## 2024-06-28 PROCEDURE — 7100000001 HC RECOVERY ROOM TIME - INITIAL BASE CHARGE: Performed by: ORTHOPAEDIC SURGERY

## 2024-06-28 PROCEDURE — 3600000004 HC OR TIME - INITIAL BASE CHARGE - PROCEDURE LEVEL FOUR: Performed by: ORTHOPAEDIC SURGERY

## 2024-06-28 PROCEDURE — 7100000009 HC PHASE TWO TIME - INITIAL BASE CHARGE: Performed by: ORTHOPAEDIC SURGERY

## 2024-06-28 PROCEDURE — 7100000002 HC RECOVERY ROOM TIME - EACH INCREMENTAL 1 MINUTE: Performed by: ORTHOPAEDIC SURGERY

## 2024-06-28 PROCEDURE — 2720000007 HC OR 272 NO HCPCS: Performed by: ORTHOPAEDIC SURGERY

## 2024-06-28 PROCEDURE — 85025 COMPLETE CBC W/AUTO DIFF WBC: CPT | Performed by: ORTHOPAEDIC SURGERY

## 2024-06-28 PROCEDURE — 7100000010 HC PHASE TWO TIME - EACH INCREMENTAL 1 MINUTE: Performed by: ORTHOPAEDIC SURGERY

## 2024-06-28 PROCEDURE — 2500000004 HC RX 250 GENERAL PHARMACY W/ HCPCS (ALT 636 FOR OP/ED): Performed by: ORTHOPAEDIC SURGERY

## 2024-06-28 PROCEDURE — 2500000004 HC RX 250 GENERAL PHARMACY W/ HCPCS (ALT 636 FOR OP/ED): Performed by: STUDENT IN AN ORGANIZED HEALTH CARE EDUCATION/TRAINING PROGRAM

## 2024-06-28 PROCEDURE — 2500000004 HC RX 250 GENERAL PHARMACY W/ HCPCS (ALT 636 FOR OP/ED): Mod: JZ | Performed by: STUDENT IN AN ORGANIZED HEALTH CARE EDUCATION/TRAINING PROGRAM

## 2024-06-28 PROCEDURE — 3600000009 HC OR TIME - EACH INCREMENTAL 1 MINUTE - PROCEDURE LEVEL FOUR: Performed by: ORTHOPAEDIC SURGERY

## 2024-06-28 PROCEDURE — 29877 ARTHRS KNEE SURG DBRDMT/SHVG: CPT | Performed by: ORTHOPAEDIC SURGERY

## 2024-06-28 PROCEDURE — 36415 COLL VENOUS BLD VENIPUNCTURE: CPT | Performed by: ORTHOPAEDIC SURGERY

## 2024-06-28 PROCEDURE — 3700000002 HC GENERAL ANESTHESIA TIME - EACH INCREMENTAL 1 MINUTE: Performed by: ORTHOPAEDIC SURGERY

## 2024-06-28 PROCEDURE — 3700000001 HC GENERAL ANESTHESIA TIME - INITIAL BASE CHARGE: Performed by: ORTHOPAEDIC SURGERY

## 2024-06-28 PROCEDURE — 2500000005 HC RX 250 GENERAL PHARMACY W/O HCPCS: Performed by: ANESTHESIOLOGIST ASSISTANT

## 2024-06-28 RX ORDER — LIDOCAINE HYDROCHLORIDE 10 MG/ML
0.1 INJECTION INFILTRATION; PERINEURAL ONCE
Status: DISCONTINUED | OUTPATIENT
Start: 2024-06-28 | End: 2024-06-28 | Stop reason: HOSPADM

## 2024-06-28 RX ORDER — PHENYLEPHRINE HCL IN 0.9% NACL 1 MG/10 ML
SYRINGE (ML) INTRAVENOUS AS NEEDED
Status: DISCONTINUED | OUTPATIENT
Start: 2024-06-28 | End: 2024-06-28

## 2024-06-28 RX ORDER — ONDANSETRON HYDROCHLORIDE 2 MG/ML
INJECTION, SOLUTION INTRAVENOUS AS NEEDED
Status: DISCONTINUED | OUTPATIENT
Start: 2024-06-28 | End: 2024-06-28

## 2024-06-28 RX ORDER — PROPOFOL 10 MG/ML
INJECTION, EMULSION INTRAVENOUS AS NEEDED
Status: DISCONTINUED | OUTPATIENT
Start: 2024-06-28 | End: 2024-06-28

## 2024-06-28 RX ORDER — ALBUTEROL SULFATE 0.83 MG/ML
2.5 SOLUTION RESPIRATORY (INHALATION) ONCE AS NEEDED
Status: DISCONTINUED | OUTPATIENT
Start: 2024-06-28 | End: 2024-06-28 | Stop reason: HOSPADM

## 2024-06-28 RX ORDER — MIDAZOLAM HYDROCHLORIDE 1 MG/ML
INJECTION, SOLUTION INTRAMUSCULAR; INTRAVENOUS AS NEEDED
Status: DISCONTINUED | OUTPATIENT
Start: 2024-06-28 | End: 2024-06-28

## 2024-06-28 RX ORDER — FENTANYL CITRATE 50 UG/ML
INJECTION, SOLUTION INTRAMUSCULAR; INTRAVENOUS AS NEEDED
Status: DISCONTINUED | OUTPATIENT
Start: 2024-06-28 | End: 2024-06-28

## 2024-06-28 RX ORDER — LABETALOL HYDROCHLORIDE 5 MG/ML
5 INJECTION, SOLUTION INTRAVENOUS ONCE AS NEEDED
Status: DISCONTINUED | OUTPATIENT
Start: 2024-06-28 | End: 2024-06-28 | Stop reason: HOSPADM

## 2024-06-28 RX ORDER — SODIUM CHLORIDE, SODIUM LACTATE, POTASSIUM CHLORIDE, CALCIUM CHLORIDE 600; 310; 30; 20 MG/100ML; MG/100ML; MG/100ML; MG/100ML
100 INJECTION, SOLUTION INTRAVENOUS CONTINUOUS
Status: DISCONTINUED | OUTPATIENT
Start: 2024-06-28 | End: 2024-06-28 | Stop reason: HOSPADM

## 2024-06-28 RX ORDER — BUPIVACAINE HYDROCHLORIDE 5 MG/ML
INJECTION, SOLUTION PERINEURAL AS NEEDED
Status: DISCONTINUED | OUTPATIENT
Start: 2024-06-28 | End: 2024-06-28 | Stop reason: HOSPADM

## 2024-06-28 RX ORDER — OXYCODONE HYDROCHLORIDE 5 MG/1
5 TABLET ORAL EVERY 4 HOURS PRN
Status: DISCONTINUED | OUTPATIENT
Start: 2024-06-28 | End: 2024-06-28 | Stop reason: HOSPADM

## 2024-06-28 RX ORDER — LIDOCAINE HYDROCHLORIDE 20 MG/ML
INJECTION, SOLUTION EPIDURAL; INFILTRATION; INTRACAUDAL; PERINEURAL AS NEEDED
Status: DISCONTINUED | OUTPATIENT
Start: 2024-06-28 | End: 2024-06-28

## 2024-06-28 RX ORDER — ASPIRIN 81 MG/1
81 TABLET ORAL 2 TIMES DAILY
Qty: 28 TABLET | Refills: 0 | Status: SHIPPED | OUTPATIENT
Start: 2024-06-28 | End: 2024-07-12

## 2024-06-28 RX ORDER — FENTANYL CITRATE 50 UG/ML
25 INJECTION, SOLUTION INTRAMUSCULAR; INTRAVENOUS EVERY 5 MIN PRN
Status: DISCONTINUED | OUTPATIENT
Start: 2024-06-28 | End: 2024-06-28 | Stop reason: HOSPADM

## 2024-06-28 RX ORDER — MEPERIDINE HYDROCHLORIDE 50 MG/ML
12.5 INJECTION INTRAMUSCULAR; INTRAVENOUS; SUBCUTANEOUS EVERY 10 MIN PRN
Status: DISCONTINUED | OUTPATIENT
Start: 2024-06-28 | End: 2024-06-28 | Stop reason: HOSPADM

## 2024-06-28 RX ORDER — ONDANSETRON HYDROCHLORIDE 2 MG/ML
4 INJECTION, SOLUTION INTRAVENOUS ONCE AS NEEDED
Status: COMPLETED | OUTPATIENT
Start: 2024-06-28 | End: 2024-06-28

## 2024-06-28 RX ORDER — CEFAZOLIN SODIUM 2 G/100ML
2 INJECTION, SOLUTION INTRAVENOUS ONCE
Status: COMPLETED | OUTPATIENT
Start: 2024-06-28 | End: 2024-06-28

## 2024-06-28 RX ORDER — HYDROCODONE BITARTRATE AND ACETAMINOPHEN 5; 325 MG/1; MG/1
1 TABLET ORAL EVERY 6 HOURS PRN
Qty: 12 TABLET | Refills: 0 | Status: SHIPPED | OUTPATIENT
Start: 2024-06-28 | End: 2024-07-03

## 2024-06-28 SDOH — HEALTH STABILITY: MENTAL HEALTH: CURRENT SMOKER: 0

## 2024-06-28 ASSESSMENT — PAIN - FUNCTIONAL ASSESSMENT
PAIN_FUNCTIONAL_ASSESSMENT: 0-10

## 2024-06-28 ASSESSMENT — PAIN DESCRIPTION - ORIENTATION
ORIENTATION: LEFT

## 2024-06-28 ASSESSMENT — PAIN SCALES - GENERAL
PAINLEVEL_OUTOF10: 0 - NO PAIN
PAINLEVEL_OUTOF10: 0 - NO PAIN
PAINLEVEL_OUTOF10: 8
PAINLEVEL_OUTOF10: 4
PAINLEVEL_OUTOF10: 7
PAINLEVEL_OUTOF10: 3
PAINLEVEL_OUTOF10: 4
PAINLEVEL_OUTOF10: 8
PAINLEVEL_OUTOF10: 8
PAINLEVEL_OUTOF10: 0 - NO PAIN
PAINLEVEL_OUTOF10: 8
PAINLEVEL_OUTOF10: 7
PAINLEVEL_OUTOF10: 4
PAINLEVEL_OUTOF10: 7
PAIN_LEVEL: 0
PAINLEVEL_OUTOF10: 7

## 2024-06-28 ASSESSMENT — COLUMBIA-SUICIDE SEVERITY RATING SCALE - C-SSRS
6. HAVE YOU EVER DONE ANYTHING, STARTED TO DO ANYTHING, OR PREPARED TO DO ANYTHING TO END YOUR LIFE?: NO
2. HAVE YOU ACTUALLY HAD ANY THOUGHTS OF KILLING YOURSELF?: NO
1. IN THE PAST MONTH, HAVE YOU WISHED YOU WERE DEAD OR WISHED YOU COULD GO TO SLEEP AND NOT WAKE UP?: NO

## 2024-06-28 ASSESSMENT — PAIN DESCRIPTION - LOCATION
LOCATION: KNEE

## 2024-06-28 NOTE — ANESTHESIA PREPROCEDURE EVALUATION
Patient: Mar Duque    Procedure Information       Anesthesia Start Date/Time: 06/28/24 0728    Procedure: Arthroscopy Knee Chondroplasty (Left: Knee)    Location: [facility] OR 10 / [facility]    Surgeons: Ovi Asif MD          Vitals:    06/28/24 0644   BP: 113/71   Pulse: 76   Resp: 16   Temp: 36.5 °C (97.7 °F)   SpO2: 95%       Past Surgical History:   Procedure Laterality Date    OTHER SURGICAL HISTORY      Right knee arthroscopy     Past Medical History:   Diagnosis Date    GERD (gastroesophageal reflux disease)        Current Facility-Administered Medications:     lactated Ringer's infusion, 100 mL/hr, intravenous, Continuous, Rick Blunt PA-C, Last Rate: 100 mL/hr at 06/28/24 0702, New Bag at 06/28/24 0728    Facility-Administered Medications Ordered in Other Encounters:     fentaNYL PF (Sublimaze) injection, , intravenous, PRN, Jebus BLOSSOM Valenzuela CAA, 50 mcg at 06/28/24 0736    lidocaine PF (Xylocaine) 20 mg/mL (2 %) injection, , intravenous, PRN, Jeb Valenzuela CAA, 60 mg at 06/28/24 0736    midazolam (Versed) injection, , intravenous, PRN, Jeb Valenzuela CAA, 2 mg at 06/28/24 0728    propofol (Diprivan) injection, , intravenous, PRN, Jeb Valenzuela CAA, 200 mg at 06/28/24 0736  Prior to Admission medications    Medication Sig Start Date End Date Taking? Authorizing Provider   dexlansoprazole (Dexilant) 30 mg DR capsule Take 20 mg by mouth once daily in the morning. Take before meals. 3/18/21  Yes Historical Provider, MD   loratadine (Claritin) 10 mg tablet Take 1 tablet (10 mg) by mouth once daily.   Yes Historical Provider, MD   multivitamin tablet Take 1 tablet by mouth once daily. 3/18/21  Yes Historical Provider, MD   omega 3-dha-epa-fish oil (Fish OiL) 1,000 mg (120 mg-180 mg) capsule Take by mouth.   Yes Historical Provider, MD   pyridoxine (Vitamin B-6) 50 mg tablet Take by mouth. 6/9/14  Yes Historical Provider, MD   valACYclovir (Valtrex) 500 mg tablet Take 1 tablet (500 mg) by  mouth once daily. 1/15/24 1/14/25 Yes Cynthia Tsai,    aspirin 81 mg EC tablet Take 1 tablet (81 mg) by mouth 2 times a day for 14 days. 6/28/24 7/12/24  Ovi Asif MD   clobetasol (Temovate) 0.05 % ointment Apply topically 2 times a day. 1/11/24   ALISSA Dean-ELIU   DOCOSAHEXAENOIC ACID ORAL Take by mouth.    Historical Provider, MD   famciclovir (Famvir) 500 mg tablet TAKE 2 TABLET Every twelve hours PRN outbreak take 2 doses for a total of 4 tabs per episode then stop 8/4/20   Historical Provider, MD   fluconazole (Diflucan) 100 mg tablet TAKE 1 TABLET (100 MG) BY MOUTH ONCE DAILY FOR 10 DAYS.    Historical Provider, MD   fluticasone (Cutivate) 0.05 % cream APPLY ONCE A DAY TO RASH FOR TWO WEEKS AS NEEDED 10/13/23   Historical Provider, MD   green tea leaf extract (Green Tea) 250 mg capsule     Historical Provider, MD   HYDROcodone-acetaminophen (Norco) 5-325 mg tablet Take 1 tablet by mouth every 6 hours if needed for severe pain (7 - 10) for up to 5 days. 6/28/24 7/3/24  Ovi Asif MD   metroNIDAZOLE (Metrogel) 0.75 % (37.5mg/5 gram) vaginal gel INSERT INTO THE VAGINA ONCE DAILY FOR 5 DAYS.    Historical Provider, MD   naproxen (Naprosyn) 500 mg tablet     Historical Provider, MD   ubrogepant (Ubrelvy) 100 mg tablet tablet Take 1 tablet (100 mg) by mouth once daily as needed for migraine. May repeat dose after 2 hours if needed. Max dose 2 tablets (200 mg) per 24 hours 4/1/24   Hoang Sylvester DO     Allergies   Allergen Reactions    Iodine Anaphylaxis     Other reaction(s): edema -throat & body    Shellfish Containing Products Anaphylaxis, Unknown and Shortness of breath     Everything swells up    Shellfish Derived Unknown and Anaphylaxis     Social History     Tobacco Use    Smoking status: Never    Smokeless tobacco: Never   Substance Use Topics    Alcohol use: Yes     Comment: weekends         Chemistry    Lab Results   Component Value Date/Time     06/28/2024 0659    K 3.6  06/28/2024 0659     06/28/2024 0659    CO2 29 06/28/2024 0659    BUN 16 06/28/2024 0659    CREATININE 0.78 06/28/2024 0659    Lab Results   Component Value Date/Time    CALCIUM 9.2 06/28/2024 0659    ALKPHOS 51 06/28/2024 0659    AST 18 06/28/2024 0659    ALT 12 06/28/2024 0659    BILITOT 0.3 06/28/2024 0659          Lab Results   Component Value Date/Time    WBC 5.2 06/28/2024 0659    HGB 12.1 06/28/2024 0659    HCT 36.1 06/28/2024 0659     06/28/2024 0659     Lab Results   Component Value Date/Time    PROTIME 11.4 06/28/2024 0659    INR 1.0 06/28/2024 0659     No results found for this or any previous visit (from the past 4464 hour(s)).     Relevant Problems   Neuro   (+) Anxiety disorder      Musculoskeletal   (+) Chondromalacia patellae, left knee   (+) Chronic low back pain      ID   (+) Herpes simplex vulvovaginitis      GYN   (+) Abnormal uterine bleeding (AUB)       Clinical information reviewed:   Tobacco  Allergies  Meds   Med Hx  Surg Hx  OB Status  Fam Hx  Soc   Hx        NPO Detail:  NPO/Void Status  Carbohydrate Drink Given Prior to Surgery? : N  Date of Last Liquid: 06/27/24  Time of Last Liquid: 2300  Date of Last Solid: 06/27/24  Time of Last Solid: 2200  Last Intake Type: Clear fluids  Time of Last Void: 0650         Physical Exam    Airway  Mallampati: II  TM distance: >3 FB     Cardiovascular - normal exam  Rhythm: regular  Rate: normal     Dental - normal exam     Pulmonary - normal exam     Abdominal - normal exam  Abdomen: soft             Anesthesia Plan    History of general anesthesia?: yes  History of complications of general anesthesia?: no    ASA 2     general     The patient is not a current smoker.  Patient was previously instructed to abstain from smoking on day of procedure.  Patient did not smoke on day of procedure.  Education provided regarding risk of obstructive sleep apnea.  intravenous induction   Postoperative administration of opioids is  intended.  Anesthetic plan and risks discussed with patient.  Use of blood products discussed with patient who.    Plan discussed with CAA.

## 2024-06-28 NOTE — ANESTHESIA PROCEDURE NOTES
Airway  Date/Time: 6/28/2024 7:37 AM  Urgency: elective    Airway not difficult    Staffing  Performed: ADRIANNE   Authorized by: Gonzalez Oro DO    Performed by: ADRIANNE Sommer  Patient location during procedure: OR    Indications and Patient Condition  Indications for airway management: anesthesia  Spontaneous Ventilation: absent  Sedation level: deep  Preoxygenated: yes  Patient position: sniffing  MILS not maintained throughout  Mask difficulty assessment: 1 - vent by mask    Final Airway Details  Final airway type: supraglottic airway      Successful airway: unique  Size 4     Number of attempts at approach: 1  Number of other approaches attempted: 0    Additional Comments  Atraumatic placement x1 attempt

## 2024-06-28 NOTE — ANESTHESIA POSTPROCEDURE EVALUATION
Patient: Mar Duque    Procedure Summary       Date: 06/28/24 Room / Location: [facility] OR  / [facility] OR    Anesthesia Start: 0728 Anesthesia Stop: 0835    Procedure: Arthroscopy Knee Chondroplasty (Left: Knee) Diagnosis:       Chondromalacia patellae, left knee      (Chondromalacia patellae, left knee [M22.42])    Surgeons: Ovi Asif MD Responsible Provider: Gonzalez Oro DO    Anesthesia Type: general ASA Status: 2            Anesthesia Type: general    Vitals Value Taken Time   /77 06/28/24 0915   Temp 36.3 °C (97.3 °F) 06/28/24 0900   Pulse 64 06/28/24 0928   Resp 8 06/28/24 0928   SpO2 99 % 06/28/24 0928   Vitals shown include unfiled device data.    Anesthesia Post Evaluation    Patient location during evaluation: PACU  Patient participation: complete - patient participated  Level of consciousness: awake  Pain score: 0  Pain management: adequate  Multimodal analgesia pain management approach  Airway patency: patent  Two or more strategies used to mitigate risk of obstructive sleep apnea  Cardiovascular status: acceptable  Respiratory status: acceptable  Hydration status: acceptable  Postoperative Nausea and Vomiting: none        No notable events documented.

## 2024-06-28 NOTE — H&P
History and physical is reviewed, patient re evaluated, no changes.  Procedure, risks, benefits, and postoperative course were discussed with the patient and consent is reviewed.    Ovi Asif MD

## 2024-06-28 NOTE — DISCHARGE INSTRUCTIONS
Post-Operative Instructions - Basic Knee Arthroscopy    Dr. Ovi Asif    Activity / Swelling:  Okay to weightbear as tolerated immediately.  A brace is NOT needed. Crutches are used for balance and support but are only needed until patient feels safe ambulating.  ICE PACK to assist with pain control   Packs should not be in direct contact with your skin  Use fairly continuously until you remove the post-op dressing  After dressing removed, use for up to 20 minutes every hour as needed for pain and swelling     Diet:  Gradually resume your normal diet as tolerated following surgery.  The evening of your surgical day, begin with liquids and/or light foods.  If you are feeling well enough the next morning, progress to your normal eating patterns    Dressing care /Showering / Hygiene:  Keep operative dressing clean, dry, and intact.     Remove dressing on Post-Op Day 3    Leave the Steri-strips (small white tapes across the incisions) in place.  If no Steri-strips or they come off with dressing cover incisions with a regular / large band aid.  Do not apply lotions or ointments to incisions.  Observe the incision sites for increased redness, drainage, or foul odor.     Showering:  Once the dressing has been removed, you may shower. Incisions may be gently cleansed with mild soap. Do not scrub or rub incisions. No baths, hot-tubs, pools, or immersive soaking of any kind until sutures are removed and incisions are healed.      Medications:  Pain:  You will be given a prescription for pain medication after your surgery.  It should be taken as needed only and in many cases is NOT needed.  The goal is to discontinue it as quickly as possible.  DO NOT drive or operate heavy machinery while taking this medication as it will make you drowsy.  Do not take any additional pain medications.  This medication often continues Tylenol / acetaminophen and NO additional acetaminophen should be taken.      You may want to consider  also taking over-the-counter stool softener and/or laxative (Colace, Senekot or Dulcolax). These medications should be taken as directed and only short term.    In addition to pain medication recommend over the counter Ibuprofen 600 mg every 6-8 hours.  Take with food and avoid if any issues with stomach/GI or kidneys.  Can also add over the counter medicine (Pepcid/omeprazole) to help protect the stomach.  Do NOT take any additional anti-inflammatories.  Do not take the ibuprofen if prior bleeding issues or history of ulcers.     Prevention of Blood Clots:  81 mg of aspirin (over the counter) to start the day after surgery for 2 weeks.   Calf pumps should be done at rest.  If you have a history of blood clots you may receive a different prescription (for example: lovenox, xarelto, eliquis)     Do NOT take if prescribed other blood thinners which will be discussed prior.    Physical therapy:  Will be discussed at first follow-up appointment but can begin immediately if scheduled, if no PT is scheduled then home therapy can begin the day after surgery ~ 4 times a day for 20 min:  prop up the heel and working on gently pushing down on thigh to promote a STRAIGHT LEG.    Quad sets: with leg straight tighten quad muscles and hold for 5 seconds.  Bending can be limited by swelling but okay to bend the leg immediately and as much as tolerated.    Driving: once off narcotics, off crutches, and good leg control is achieved.  Will be discussed at first visit.    Follow-up:         Generally 10-14 days post-op      Call with any concerns, especially calf pain, signs of infection (fever, drainage) or shortness of breath.  [phone]

## 2024-06-28 NOTE — OP NOTE
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  ASA: ASA status not filed in the log.  Anesthesia Staff: Anesthesiologist: Gonzalez Oro DO  C-AA: ADRIANNE Sommer  Estimated Blood Loss: 5 mL  Intra-op Medications:   Administrations occurring from 0745 to 0835 on 24:   Medication Name Total Dose   BUPivacaine HCl (Marcaine) 0.5 % (5 mg/mL) injection 10 mL   lactated Ringer's infusion Cannot be calculated              Anesthesia Record               Intraprocedure I/O Totals          Intake    lactated Ringer's infusion 700.00 mL    ceFAZolin in dextrose (iso-os) (Ancef) IVPB 2 g 100.00 mL    Total Intake 800 mL       Output    Est. Blood Loss 5 mL    Total Output 5 mL       Net    Net Volume 795 mL          Specimen: No specimens collected     Staff:   Scrub Person: Avelina  Circulator: Marcellus           Implants:     Findings:   Operative findings: (Outerbridge classification 0-4)   Patella: 3  Trochlea: 0  Medial compartment: 0  Lateral compartment:  grade iv defect with delamination 0.85 cm x 0.6 cm    Indications:     The patient was seen in the preoperative area. The risks, benefits, complications, treatment options, non-operative alternatives, expected recovery and outcomes were discussed with the patient. The possibilities of reaction to medication, pulmonary  aspiration, injury to surrounding structures, bleeding, recurrent infection, the need for additional procedures, failure to diagnose a condition, and creating a complication requiring transfusion or operation were discussed with the patient. The patient concurred with the proposed plan, giving informed consent.  The site of surgery was properly noted/marked if necessary per policy. The patient has been actively warmed in preoperative area. Preoperative antibiotics have been ordered and given within 1 hours of incision. Venous thrombosis prophylaxis have been ordered.     Patient was noted to have internal derangement of the knee refractory to non-surgical modalities.  We discussed associated interventions and the risks of the surgery, including DVT/PE, infection, stiffness, medical complications, and incomplete relief of pre-operative symptoms.    Procedure Details:   The patient was met prior to surgery and the appropriate extremity was marked.  We reviewed recent health history and found no contraindication to proceeding with the planned procedure.  The patient was transported to the operating room and underwent general anesthesia.  The patient was positioned supine on the operative table with all lamonte prominences well-padded. A sequential compression device was placed on the contralateral leg.  A non-sterile thigh tourniquet was placed and a padded lateral thigh post.    The leg was prepped and draped in the usual sterile fashion.  A timeout was completed and antibiotic administration was in accordance with standard protocol.  The leg was exsanguinated using an Esmarch bandage and the tourniquet was inflated.  The superficial landmarks of the knee were palpated and anteromedial and anterolateral portals were created.    A diagnostic arthroscopy was performed utilizing a fluid pump with sterile saline.  The articular surface of the patella, trochlea, medial and lateral femoral condyles and tibial plateaus were  evaluated.  The Outerbridge classifications are listed above.  The gutters were evaluated and no loose bodies were noted. The medial compartment was entered with valgus stress in a slightly flexed knee against the lateral post.  The assistant helped with this to facilitate visualization.  The meniscus was probed superiorly and inferiorly using a blunt probe.  The notch was inspected and the ACL and PCL were healthy in appearance and had appropriate tension when probed.  The knee was then flexed into a figure of four position and the lateral compartment was entered.      The articular surface of the patella and lateral femoral condyle had chondral wear and a component of delamination which we felt could contribute to mechanical symptoms.  A 3.5 mm aggressive plus shaver was used to debride the articular cartilage until stable margins were obtained.  There was a delaminated piece of cartilage on the lateral femoral condyle that was full-thickness it measured approximately 0.85 cm x 2.6 cm in diameter.    The menisci were normal and healthy.    The knee was irrigated and lavaged to remove and loose meniscal or chondral debris.  A second pass was made diagnostically to confirm removal of any fragments and inspect for any additional pathology.  The residual fluid was expressed from the knee.  The portals were closed using a buried 3-0 monocryl suture.  Local anesthetic was injected into the soft tissue around the portals. Sterile bandages were placed.  The patient was stable to the recovery room.    I was present and participated in the entire procedure. The Physician Assistant participated in all critical elements of the procedure under my direct supervision. The surgical incision was closed by the PA under my indirect supervision. There were no qualified residents available to assist.    The physician assistant was present for the entire case.  Given the nature of the procedure and disease process, a skilled surgical  assistant was necessary for the case.  The assistant was necessary for retraction and helped directly facilitate completion of the surgery.  A certified scrub tech was at the back table managing instruments and supplies for the surgical procedure.      Complications:  None; patient tolerated the procedure well.    Disposition: PACU - hemodynamically stable.  Condition: stable         Ovi Asif  Phone Number: [phone]

## 2024-07-10 ENCOUNTER — APPOINTMENT (OUTPATIENT)
Dept: ORTHOPEDIC SURGERY | Facility: CLINIC | Age: 44
End: 2024-07-10
Payer: COMMERCIAL

## 2024-07-10 DIAGNOSIS — S83.242S ACUTE MEDIAL MENISCUS TEAR, LEFT, SEQUELA: Primary | ICD-10-CM

## 2024-07-10 PROCEDURE — 99024 POSTOP FOLLOW-UP VISIT: CPT | Performed by: ORTHOPAEDIC SURGERY

## 2024-07-10 PROCEDURE — 1036F TOBACCO NON-USER: CPT | Performed by: ORTHOPAEDIC SURGERY

## 2024-07-10 PROCEDURE — 99211 OFF/OP EST MAY X REQ PHY/QHP: CPT | Performed by: ORTHOPAEDIC SURGERY

## 2024-07-10 NOTE — PROGRESS NOTES
History of Present Illness:  Patient returns today noting minimal pain.  Denies any calf pain or shortness of breath.    Physical Examination:   Mild effusion  Healthy incisions - no active drainage  Good range of motion  No calf swelling  Negative Jovan´s test  Distal neurovascular exam intact    Operative Findings:  Grade 4 defect lateral femoral condyle more diffuse wear of the patella    Assessment:  Patient status post right knee chondroplasty    Plan:  Reviewed arthroscopic photos and findings.  Discussed short and long term implications for the knee.  We discussed the possibility of an osteochondral allograft for the lateral defect but do have some concern that based on the degree of patellar arthrosis may be a fairly involved procedure with only partial pain relief.  Discussed analgesics, ice, rest.  Encouraged home exercise program, physical therapy.

## 2024-07-11 ENCOUNTER — APPOINTMENT (OUTPATIENT)
Dept: ORTHOPEDIC SURGERY | Facility: CLINIC | Age: 44
End: 2024-07-11
Payer: COMMERCIAL

## 2024-07-15 ENCOUNTER — SPECIALTY PHARMACY (OUTPATIENT)
Dept: PHARMACY | Facility: CLINIC | Age: 44
End: 2024-07-15

## 2024-07-22 ENCOUNTER — OFFICE VISIT (OUTPATIENT)
Dept: ORTHOPEDIC SURGERY | Facility: CLINIC | Age: 44
End: 2024-07-22
Payer: COMMERCIAL

## 2024-07-22 DIAGNOSIS — M76.71 PERONEAL TENDONITIS, RIGHT: Primary | ICD-10-CM

## 2024-07-22 PROCEDURE — 99213 OFFICE O/P EST LOW 20 MIN: CPT | Performed by: INTERNAL MEDICINE

## 2024-07-22 PROCEDURE — 99203 OFFICE O/P NEW LOW 30 MIN: CPT | Performed by: INTERNAL MEDICINE

## 2024-07-22 NOTE — PROGRESS NOTES
Acute Injury New Patient Visit    CC: No chief complaint on file.      HPI: Mar is a 44 y.o. female presents today for evaluation for acute right ankle pain which started last week. She denies any trauma or fall. She states that she had a scope to her left knee by Dr Asif was wonder if was over compensating and with the right leg. She was evaluated at the urgent care where x-rays were taken. She is here for initial evaluation and x-rays.        Review of Systems   GENERAL: Negative for malaise, significant weight loss, fever  MUSCULOSKELETAL: See HPI  NEURO:  Negative for numbness / tingling     Past Medical History  Past Medical History:   Diagnosis Date    GERD (gastroesophageal reflux disease)        Medication review  Medication Documentation Review Audit       Reviewed by Elizabeth Christianson MA (Medical Assistant) on 07/10/24 at 1109      Medication Order Taking? Sig Documenting Provider Last Dose Status   aspirin 81 mg EC tablet [id number]  Take 1 tablet (81 mg) by mouth 2 times a day for 14 days. Ovi Asif MD  Active   clobetasol (Temovate) 0.05 % ointment [id number] No Apply topically 2 times a day. ALISSA Dean-ELIU Not Taking Active   dexlansoprazole (Dexilant) 30 mg DR capsule [id number] No Take 20 mg by mouth once daily in the morning. Take before meals. Amee Ni MD 6/27/2024 Active   DOCOSAHEXAENOIC ACID ORAL [id number] No Take by mouth. Amee Ni MD Not Taking Active   famciclovir (Famvir) 500 mg tablet [id number] No TAKE 2 TABLET Every twelve hours PRN outbreak take 2 doses for a total of 4 tabs per episode then stop Amee Ni MD Not Taking Active   fluconazole (Diflucan) 100 mg tablet [id number] No TAKE 1 TABLET (100 MG) BY MOUTH ONCE DAILY FOR 10 DAYS. Amee Ni MD Not Taking Active   fluticasone (Cutivate) 0.05 % cream [id number] No APPLY ONCE A DAY TO RASH FOR TWO WEEKS AS NEEDED Amee Ni MD Not Taking Active   green tea leaf  extract (Green Tea) 250 mg capsule [id number] No  Historical Provider, MD Not Taking Active   HYDROcodone-acetaminophen (Norco) 5-325 mg tablet [id number]  Take 1 tablet by mouth every 6 hours if needed for severe pain (7 - 10) for up to 5 days. Ovi Asif MD   24 1196   loratadine (Claritin) 10 mg tablet [id number] No Take 1 tablet (10 mg) by mouth once daily. Historical Provider, MD Past Week Active   metroNIDAZOLE (Metrogel) 0.75 % (37.5mg/5 gram) vaginal gel [id number] No INSERT INTO THE VAGINA ONCE DAILY FOR 5 DAYS. Historical Provider, MD Not Taking Active   multivitamin tablet [id number] No Take 1 tablet by mouth once daily. Historical Provider, MD Past Week Active   naproxen (Naprosyn) 500 mg tablet [id number] No  Historical Provider, MD Not Taking Active   omega 3-dha-epa-fish oil (Fish OiL) 1,000 mg (120 mg-180 mg) capsule [id number] No Take by mouth. Historical Provider, MD 2024 Active   pyridoxine (Vitamin B-6) 50 mg tablet [id number] No Take by mouth. Historical Provider, MD 2024 Active   ubrogepant (Ubrelvy) 100 mg tablet tablet [id number] No Take 1 tablet (100 mg) by mouth once daily as needed for migraine. May repeat dose after 2 hours if needed. Max dose 2 tablets (200 mg) per 24 hours Hoang Sylvester DO Unknown Active   valACYclovir (Valtrex) 500 mg tablet [id number] No Take 1 tablet (500 mg) by mouth once daily. Cynthia Tsai DO 2024 Active                    Allergies  Allergies   Allergen Reactions    Iodine Anaphylaxis     Other reaction(s): edema -throat & body    Shellfish Containing Products Anaphylaxis, Unknown and Shortness of breath     Everything swells up    Shellfish Derived Unknown and Anaphylaxis       Social History  Social History     Socioeconomic History    Marital status:      Spouse name: Not on file    Number of children: Not on file    Years of education: Not on file    Highest education level: Not on file   Occupational History    Not on file    Tobacco Use    Smoking status: Never    Smokeless tobacco: Never   Vaping Use    Vaping status: Never Used   Substance and Sexual Activity    Alcohol use: Yes     Comment: weekends    Drug use: Never    Sexual activity: Defer   Other Topics Concern    Not on file   Social History Narrative    Not on file     Social Determinants of Health     Financial Resource Strain: Not on file   Food Insecurity: Not on file   Transportation Needs: Not on file   Physical Activity: Not on file   Stress: Not on file   Social Connections: Not on file   Intimate Partner Violence: Not on file   Housing Stability: Not on file       Surgical History  Past Surgical History:   Procedure Laterality Date    OTHER SURGICAL HISTORY      Right knee arthroscopy       Physical Exam:  GENERAL:  Patient is awake, alert, and oriented to person place and time.  Patient appears well nourished and well kept.  Affect Calm, Not Acutely Distressed.  HEENT:  Normocephalic, Atraumatic, EOMI  CARDIOVASCULAR:  Hemodynamically stable.  RESPIRATORY:  Normal respirations with unlabored breathing.  Extremity: Right ankle shows skin is intact.  There is no erythema or warmth.  Mild swelling localized to the lateral aspect with slight bruising and ecchymosis.  There is no clinical signs of infection.  There is no pain over the lateral malleolus.  There is no pain of the medial malleolus.  Pain mainly over the peroneal tendon.  There is no pain over the ATF, CF or PTF ligament.  There is no pain over the deltoid ligament.  No pain over the Achilles tendon.  Negative Farmer's test.  Negative squeeze test.  Negative anterior drawer test.  Negative talar tilt test.  No pain over the anterior process of the talus.  There is no pain over the talar dome.  There is no pain at the base of the fifth metatarsal bone.  No pain of the calcaneus.  No pain over the plantar aponeurosis.  No pain of the midfoot.  Neurovascularly intact.      Diagnostics: X-rays reviewed  XR  knee left 4+ views  Interpreted By:  Shane Smith,   STUDY:  XR KNEE LEFT 4+ VIEWS; 3/13/2024 3:41 pm      INDICATION:  Signs/Symptoms:pain.      ACCESSION NUMBER(S):  [id number]      ORDERING CLINICIAN:  SHANE SMITH      FINDINGS:  Left knee x-ray AP lateral Merchant and bilateral flexion PA view:  Patient with some evidence of lateral tilt of the patella on the  Merchant view. No evidence of acute fractures or significant  degenerative changes.          Signed by: Shane Smith 3/13/2024 5:01 PM  Dictation workstation:   [id number]      Procedure: None    Assessment: Right peroneal tendinitis /strain    Plan: Mar presents today for initial evaluation for acute right ankle pain which started last week. X-rays showed of obvious fractures.  She states a fracture boot given to her causes more pain, we recommended a lace up ankle brace for support, ice, elevation. She will follow-up with Dr Smith as scheduled.     No orders of the defined types were placed in this encounter.     At the conclusion of the visit there were no further questions by the patient/family regarding their plan of care.  Patient was instructed to call or return with any issues, questions, or concerns regarding their injury and/or treatment plan described above.     07/22/24 at 1:20 PM - Madeline Clayton MD  Scribe Attestation  By signing my name below, I, Shravan Avila, Scribe   attest that this documentation has been prepared under the direction and in the presence of Madeline Clayton MD.    Office: [phone]    This note was prepared using voice recognition software.  The details of this note are correct and have been reviewed, and corrected to the best of my ability.  Some grammatical errors may persist related to the Dragon software.

## 2024-08-09 ENCOUNTER — SPECIALTY PHARMACY (OUTPATIENT)
Dept: PHARMACY | Facility: CLINIC | Age: 44
End: 2024-08-09

## 2024-08-14 ENCOUNTER — APPOINTMENT (OUTPATIENT)
Dept: ORTHOPEDIC SURGERY | Facility: CLINIC | Age: 44
End: 2024-08-14
Payer: COMMERCIAL

## 2024-08-14 ENCOUNTER — OFFICE VISIT (OUTPATIENT)
Dept: ORTHOPEDIC SURGERY | Facility: CLINIC | Age: 44
End: 2024-08-14
Payer: COMMERCIAL

## 2024-08-14 DIAGNOSIS — G89.18 POST-OP PAIN: Primary | ICD-10-CM

## 2024-08-14 DIAGNOSIS — M25.572 ACUTE BILATERAL ANKLE PAIN: ICD-10-CM

## 2024-08-14 DIAGNOSIS — M25.571 ACUTE BILATERAL ANKLE PAIN: ICD-10-CM

## 2024-08-14 PROCEDURE — 99211 OFF/OP EST MAY X REQ PHY/QHP: CPT | Mod: 24 | Performed by: STUDENT IN AN ORGANIZED HEALTH CARE EDUCATION/TRAINING PROGRAM

## 2024-08-14 PROCEDURE — 99024 POSTOP FOLLOW-UP VISIT: CPT | Performed by: STUDENT IN AN ORGANIZED HEALTH CARE EDUCATION/TRAINING PROGRAM

## 2024-08-14 PROCEDURE — 1036F TOBACCO NON-USER: CPT | Performed by: STUDENT IN AN ORGANIZED HEALTH CARE EDUCATION/TRAINING PROGRAM

## 2024-08-14 PROCEDURE — 99213 OFFICE O/P EST LOW 20 MIN: CPT | Performed by: STUDENT IN AN ORGANIZED HEALTH CARE EDUCATION/TRAINING PROGRAM

## 2024-08-14 NOTE — PROGRESS NOTES
History of Present Illness:   Patient presents today status post left knee chondroplasty about 2 month out, doing well.  She does have some ongoing swelling and pain in the knee that is been able to be relieved by Aleve.  She also notes some bilateral ankle pain that she was seen recently for in the walk-in clinic and was prescribed lace up brace as well as activity modification.  The ankle still bother her she is inquiring about options today.    Review of Systems   GENERAL: Negative for malaise, significant weight loss, fever  MUSCULOSKELETAL: see HPI  NEURO:  Negative    Physical Examination:  Left knee:  Skin healthy and intact, well-healed incisions  No gross swelling or ecchymosis  No varus malalignment  No valgus malalignment   No effusion  ROM:  Full flexion   Full extension  No pain with internal rotation of the hip     No tenderness to palpation over medial joint line  No tenderness to palpation over lateral joint line  No laxity to valgus stress  No laxity to varus stress  Negative Lachman´s test  Negative anterior drawer test  Negative posterior drawer test  Negative Sheba´s test     Neurovascular exam normal distally    Bilateral ankle:     Skin healthy and intact  Swelling: None  No appreciable effusion   No significant pes planus or cavus  Plantar flexion, dorsiflexion, and inversion/eversion symmetric with contralateral side     Mild tenderness to palpation over lateral ligamentous complex  No tenderness to palpation over deltoid ligament  No tenderness to palpation over lateral malleolus  No tenderness to palpation over medial malleolus  No tenderness to palpation over achilles tendon     No weakness with resisted dorsiflexion, plantar flexion, inversion or eversion  Negative squeeze test  Negative talar tilt  Negative drawer test  Able to perform single leg heel raise      Imaging:  Deferred    Assessment:   Patient left knee pain status post arthroscopy chondroplasty, doing well.  Patient  bilateral ankle pain laterally concern for peroneal tendon irritation    Plan:  Discussed possible referral to foot and ankle specialist if her problems persist after physical therapy, rest ice anti-inflammatories was doing modification.  Also discussed that the knee is overall doing well and would advise nonweightbearing activity such as exercise bike, swimming, elliptical.  Follow up as needed.     Rick Blunt PA-C

## 2024-08-19 ENCOUNTER — APPOINTMENT (OUTPATIENT)
Dept: NEUROLOGY | Facility: CLINIC | Age: 44
End: 2024-08-19
Payer: COMMERCIAL

## 2024-08-19 DIAGNOSIS — G43.709 CHRONIC MIGRAINE WITHOUT AURA WITHOUT STATUS MIGRAINOSUS, NOT INTRACTABLE: Primary | ICD-10-CM

## 2024-08-19 NOTE — PROGRESS NOTES
Neurology Botulinum Injection    Subjective   Mar Duque is an 44 y.o. female here for medical botulinum injection for Chronic migraine without aura without status migrainosus, not intractable [G43.709].     Notes that after last botox, headache frequency and severity did not improve. Notes that she worsening of pain in the paracervical region after Botox. Would like to proceed with Botox today.      Botox    Date/Time: 8/19/2024 3:46 PM    Performed by: Hoang Sylvester DO  Authorized by: Hoang Sylvester DO    Procedure specific details:      Procedure Note: PREEMPT Botox    Indications: Chronic Migraine    Informed consent was obtained (explaining the procedure and risks and benefits of procedure) from patient: the signed consent form was placed in the medical record.  A time out was completed, verifying correct patient, procedure,site, positioning, and implants or special equipment.    200 units of OnabotulinumtoxinA were reconstituted with saline. Sites of injection were identified via PREEMPT protocol and cleaned with alcohol pads. Using a 30 gauge needle, patient received following injections:    5 units in procerus  5 units in each left and right   10 units divided between 2 sites of L frontalis  10 units divided between 2 sites of R frontalis  20 units divided between 4 sites of L temporalis  20 units divided between 4 sites of R temporalis  15 units divided between 3 sites of L occipitalis  15 units divided between 3 sites of R occipitalis  15 units divided between 3 sites of L trapezius  15 units divided between 3 sites of R trapezius    Sites Removed:  10 units divided between 2 sites of L paracervical muscles  10 units divided between 2 sites of R paracervical muscles    Sites Added:  10u L temporalis  10u R temporalis    Used: 155u  Wasted: 45u    There were no complications. Patient was comfortable and left without complaint.     OnabotulinumtoxinA  Lot #: [id number]  Exp: 11/2026

## 2024-09-05 ENCOUNTER — SPECIALTY PHARMACY (OUTPATIENT)
Dept: PHARMACY | Facility: CLINIC | Age: 44
End: 2024-09-05

## 2024-09-05 PROCEDURE — RXMED WILLOW AMBULATORY MEDICATION CHARGE

## 2024-09-09 ENCOUNTER — PHARMACY VISIT (OUTPATIENT)
Dept: PHARMACY | Facility: CLINIC | Age: 44
End: 2024-09-09
Payer: COMMERCIAL

## 2024-11-10 ENCOUNTER — SPECIALTY PHARMACY (OUTPATIENT)
Dept: PHARMACY | Facility: CLINIC | Age: 44
End: 2024-11-10

## 2024-11-25 ENCOUNTER — APPOINTMENT (OUTPATIENT)
Dept: NEUROLOGY | Facility: CLINIC | Age: 44
End: 2024-11-25
Payer: COMMERCIAL

## 2024-12-09 ENCOUNTER — APPOINTMENT (OUTPATIENT)
Dept: NEUROLOGY | Facility: CLINIC | Age: 44
End: 2024-12-09
Payer: COMMERCIAL

## 2024-12-09 DIAGNOSIS — G43.709 CHRONIC MIGRAINE WITHOUT AURA WITHOUT STATUS MIGRAINOSUS, NOT INTRACTABLE: Primary | ICD-10-CM

## 2024-12-09 PROCEDURE — 96372 THER/PROPH/DIAG INJ SC/IM: CPT | Mod: 59 | Performed by: STUDENT IN AN ORGANIZED HEALTH CARE EDUCATION/TRAINING PROGRAM

## 2024-12-09 PROCEDURE — 64615 CHEMODENERV MUSC MIGRAINE: CPT | Performed by: STUDENT IN AN ORGANIZED HEALTH CARE EDUCATION/TRAINING PROGRAM

## 2024-12-09 PROCEDURE — 2500000004 HC RX 250 GENERAL PHARMACY W/ HCPCS (ALT 636 FOR OP/ED): Mod: JZ | Performed by: STUDENT IN AN ORGANIZED HEALTH CARE EDUCATION/TRAINING PROGRAM

## 2024-12-09 RX ORDER — INFLUENZA VIRUS VACCINE 15; 15; 15 UG/.5ML; UG/.5ML; UG/.5ML
SUSPENSION INTRAMUSCULAR
COMMUNITY
Start: 2024-09-26

## 2024-12-09 NOTE — PROGRESS NOTES
Neurology Botulinum Injection    Subjective   Mar Duque is an 44 y.o. female here for medical botulinum injection for Chronic migraine without aura without status migrainosus, not intractable [G43.709].     Since last Botox, patient has been having 26/30 HA days per month. Denies any side effects to Botox.     Botox    Date/Time: 12/9/2024 3:49 PM    Performed by: Hoang Sylvester DO  Authorized by: Hoang Sylvester DO    Procedure specific details:      Procedure Note: PREEMPT Botox    Indications: Chronic Migraine    Informed consent was obtained (explaining the procedure and risks and benefits of procedure) from patient: the signed consent form was placed in the medical record.  A time out was completed, verifying correct patient, procedure,site, positioning, and implants or special equipment.    200 units of OnabotulinumtoxinA were reconstituted with saline. Sites of injection were identified via PREEMPT protocol and cleaned with alcohol pads. Using a 30 gauge needle, patient received following injections:    5 units in procerus  5 units in each left and right   10 units divided between 2 sites of L frontalis  10 units divided between 2 sites of R frontalis  20 units divided between 4 sites of L temporalis  20 units divided between 4 sites of R temporalis  15 units divided between 3 sites of L occipitalis  15 units divided between 3 sites of R occipitalis  15 units divided between 3 sites of L trapezius  15 units divided between 3 sites of R trapezius    Sites Removed:  10 units divided between 2 sites of L paracervical muscles  10 units divided between 2 sites of R paracervical muscles    Additional Sites:  5u L trapezius  5u R trapezius  10u L temporalis  10u R temporalis    Used: 155u  Wasted: 45u    There were no complications. Patient was comfortable and left without complaint.     OnabotulinumtoxinA  Lot #: [id number]  Exp: 4/2027

## 2025-01-20 ENCOUNTER — APPOINTMENT (OUTPATIENT)
Dept: PRIMARY CARE | Facility: CLINIC | Age: 45
End: 2025-01-20
Payer: COMMERCIAL

## 2025-02-01 DIAGNOSIS — B00.9 HSV (HERPES SIMPLEX VIRUS) INFECTION: ICD-10-CM

## 2025-02-03 RX ORDER — VALACYCLOVIR HYDROCHLORIDE 500 MG/1
500 TABLET, FILM COATED ORAL DAILY
Qty: 90 TABLET | Refills: 3 | Status: SHIPPED | OUTPATIENT
Start: 2025-02-03

## 2025-03-10 ENCOUNTER — PROCEDURE VISIT (OUTPATIENT)
Dept: NEUROLOGY | Facility: CLINIC | Age: 45
End: 2025-03-10
Payer: COMMERCIAL

## 2025-03-10 DIAGNOSIS — G43.709 CHRONIC MIGRAINE W/O AURA W/O STATUS MIGRAINOSUS, NOT INTRACTABLE: Primary | ICD-10-CM

## 2025-03-10 PROCEDURE — 64615 CHEMODENERV MUSC MIGRAINE: CPT | Performed by: STUDENT IN AN ORGANIZED HEALTH CARE EDUCATION/TRAINING PROGRAM

## 2025-03-10 PROCEDURE — 2500000004 HC RX 250 GENERAL PHARMACY W/ HCPCS (ALT 636 FOR OP/ED): Mod: JW | Performed by: STUDENT IN AN ORGANIZED HEALTH CARE EDUCATION/TRAINING PROGRAM

## 2025-03-10 RX ADMIN — ONABOTULINUMTOXINA 165 UNITS: 100 INJECTION, POWDER, LYOPHILIZED, FOR SOLUTION INTRADERMAL; INTRAMUSCULAR at 15:56

## 2025-03-10 NOTE — PROGRESS NOTES
Subjective   Since last Botox, having 25/30 HA days per month. Notes a 50% improvement in severity. No side effects to Botox.    Botox    Date/Time: 3/10/2025 3:46 PM    Performed by: Hoang Sylvester DO  Authorized by: Hoang Sylvester DO    Procedure specific details:      Procedure Note: PREEMPT Botox     Indications: Chronic Migraine     Informed consent was obtained (explaining the procedure and risks and benefits of procedure) from patient: the signed consent form was placed in the medical record.  A time out was completed, verifying correct patient, procedure,site, positioning, and implants or special equipment.     200 units of OnabotulinumtoxinA were reconstituted with saline. Sites of injection were identified via PREEMPT protocol and cleaned with alcohol pads. Using a 30 gauge needle, patient received following injections:     5 units in procerus  5 units in each left and right   10 units divided between 2 sites of L frontalis  10 units divided between 2 sites of R frontalis  20 units divided between 4 sites of L temporalis  20 units divided between 4 sites of R temporalis  15 units divided between 3 sites of L occipitalis  15 units divided between 3 sites of R occipitalis  15 units divided between 3 sites of L trapezius  15 units divided between 3 sites of R trapezius     Sites Removed:  10 units divided between 2 sites of L paracervical muscles  10 units divided between 2 sites of R paracervical muscles     Additional Sites:  5u L trapezius  5u R trapezius  10u L temporalis  10u R temporalis     Used: 165u  Wasted: 35u     There were no complications. Patient was comfortable and left without complaint.      OnabotulinumtoxinA  Lot #: [id number]  Exp: 4/2027

## 2025-03-19 ENCOUNTER — HOSPITAL ENCOUNTER (OUTPATIENT)
Dept: RADIOLOGY | Facility: HOSPITAL | Age: 45
Discharge: HOME | End: 2025-03-19
Payer: COMMERCIAL

## 2025-03-19 ENCOUNTER — APPOINTMENT (OUTPATIENT)
Dept: ORTHOPEDIC SURGERY | Facility: CLINIC | Age: 45
End: 2025-03-19
Payer: COMMERCIAL

## 2025-03-19 DIAGNOSIS — M25.552 LEFT HIP PAIN: ICD-10-CM

## 2025-03-19 DIAGNOSIS — M53.3 SI (SACROILIAC) JOINT DYSFUNCTION: Primary | ICD-10-CM

## 2025-03-19 PROCEDURE — 99214 OFFICE O/P EST MOD 30 MIN: CPT | Performed by: ORTHOPAEDIC SURGERY

## 2025-03-19 PROCEDURE — 73502 X-RAY EXAM HIP UNI 2-3 VIEWS: CPT | Mod: LT

## 2025-03-19 PROCEDURE — 73502 X-RAY EXAM HIP UNI 2-3 VIEWS: CPT | Mod: LEFT SIDE | Performed by: RADIOLOGY

## 2025-03-19 PROCEDURE — 1036F TOBACCO NON-USER: CPT | Performed by: ORTHOPAEDIC SURGERY

## 2025-03-19 RX ORDER — ESTRADIOL 0.1 MG/G
1 CREAM VAGINAL 2 TIMES WEEKLY
COMMUNITY
Start: 2025-02-24 | End: 2025-05-25

## 2025-03-19 RX ORDER — CYCLOBENZAPRINE HCL 5 MG
5 TABLET ORAL NIGHTLY
Qty: 14 TABLET | Refills: 0 | Status: SHIPPED | OUTPATIENT
Start: 2025-03-19

## 2025-03-19 RX ORDER — IBUPROFEN 800 MG/1
800 TABLET ORAL EVERY 8 HOURS PRN
Qty: 90 TABLET | Refills: 0 | Status: SHIPPED | OUTPATIENT
Start: 2025-03-19 | End: 2025-04-18

## 2025-03-19 RX ORDER — MELOXICAM 15 MG/1
15 TABLET ORAL
Qty: 30 TABLET | Refills: 0 | Status: SHIPPED | OUTPATIENT
Start: 2025-03-19

## 2025-03-20 NOTE — PROGRESS NOTES
Subjective    Patient ID: Mar    Chief Complaint:   Chief Complaint   Patient presents with    Left Hip - New Patient Visit, Pain     Xrays today      44-year-old female presented clinic today as new patient Dr. Hahn for evaluation left hip pain.  She states over the last few months she has had increased anterior left hip pain mostly over the left hip flexor region.  This started after doing some yoga sessions.  She has tried physical therapy at [facility] where they have done myofascial massage work and stretching some of which seems to have flared up her posterior lateral left hip and hip flexor region anymore.  She has difficulty sleeping.  Difficulty with certain movements.  She has to lift her left leg with her hands to get out of the car.  No acute injury.  She states that she has had pelvic pain since having children.  She has tried over-the-counter NSAIDs without much relief.  No numbness tingling reported.        Past medical history        The patient's past medical history, family history, social history, and review of systems were documented on the patient's medical intake form.  The medical intake form was reviewed and scanned into the electronic medical record for future use.  History is otherwise negative except as stated in the HPI.     Physical exam     General: Alert and oriented to place, person, and time.  No acute distress and breathing comfortably; pleasant and cooperative with the examination.  HEENT: Head is normocephalic and atraumatic.  Neck: Supple, no visible swelling.  Cardiovascular: Good perfusion to the affected extremity.  Lungs: No audible wheezing or labored breathing.  Abdomen: Nondistended     Extremity:  Left hip is neurovascular intact.  She demonstrates very good range of motion with passive range of motion with no groin pain present.  She does have mild hip flexor tightness and discomfort with active range of motion.  Negative straight leg raise test.  Mild  tenderness palpation over the left hip bursa and over the posterior lateral left hip region.  She also has tenderness palpation over the right side lumbar spine paraspinal musculature which has mild spasm.     Diagnostics:  Please see dictated x-ray report     Procedures  [none   ]     Assessment:  Left-sided SI joint dysfunction  Left hip flexor tightness     Treatment plan:  1.  At this time we a long discussion regards to continued conservative treatment options.  2.  We will start an outpatient physical therapy back at [facility] where she was going previously but this time for pelvic stability and core strength program.  Prescription Medrol Dosepak sent to the pharmacy.  Prescription ibuprofen also sent to the pharmacy and muscle relaxant.  3.  She will follow-up with us in 5 weeks to see how she is doing that time.  For complete plan and/or surgical details, please refer to Dr. Hahn's portion of the split/shared dictation.  4.  All of the patient's questions were answered.     This note was prepared using voice recognition software.  The details of this note are correct and have been reviewed, and corrected to the best of my ability.  Some grammatical areas may persist related to the Dragon software     Adrian White PA-C, [facility]  Orthopedic [place]     [phone]    In a face-to-face encounter performed today, I Brett Hahn MD performed a history and physical examination, discussed pertinent diagnostic studies if indicated, and discussed diagnosis and management strategies with both the patient and the midlevel provider.  I reviewed the midlevel's note and agree with the documented findings and plan of care.  Greater than 50% of the evaluation and treatment decision was performed by the physician myself during today's visit.    New patient me 44-year-old female here for evaluation pain in her left hip.  She states been having trouble for the past couple months but  really probably longer than that she has been working on some physical therapy does not seem to be helping she has been taking Aleve most of her pain is in the posterior aspect of the left hip sometimes radiates down towards her knee sometimes in the anterior aspect of the hip.  She denies any numbness or tingling.  On examination she has minimal pain with internal ex rotation hip most of her tenderness along the SI joint and into the sciatic notch good muscle strength distally brisk capillary refill flexion abduction external rotation maneuver is negative.  X-rays are obtained today and independently reviewed by myself.  Studies available for review are independently reviewed by myself and interpreted based on my review, including labs and radiology and outside notes are reviewed if available.  Impression is SI joint dysfunction left hip.  Plan is Medrol Dosepak, transitioning to ibuprofen, Flexeril, focused physical therapy to work on pelvic stability and core strengthening follow-up in 4 to 6 weeks.          This note was prepared using voice recognition software.  The details of this note are correct and have been reviewed, and corrected to the best of my ability.  Some grammatical areas may persist related to the Dragon software    Brett Hahn MD  Senior Attending Physician  [facility]    [phone]

## 2025-04-21 ENCOUNTER — HOSPITAL ENCOUNTER (OUTPATIENT)
Dept: RADIOLOGY | Facility: HOSPITAL | Age: 45
Discharge: HOME | End: 2025-04-21
Payer: COMMERCIAL

## 2025-04-21 VITALS — HEIGHT: 69 IN | WEIGHT: 190 LBS | BODY MASS INDEX: 28.14 KG/M2

## 2025-04-21 DIAGNOSIS — Z12.31 ENCOUNTER FOR SCREENING MAMMOGRAM FOR MALIGNANT NEOPLASM OF BREAST: ICD-10-CM

## 2025-04-21 DIAGNOSIS — M53.3 SI (SACROILIAC) JOINT DYSFUNCTION: ICD-10-CM

## 2025-04-21 PROCEDURE — 77063 BREAST TOMOSYNTHESIS BI: CPT | Performed by: STUDENT IN AN ORGANIZED HEALTH CARE EDUCATION/TRAINING PROGRAM

## 2025-04-21 PROCEDURE — 77067 SCR MAMMO BI INCL CAD: CPT

## 2025-04-21 PROCEDURE — 77067 SCR MAMMO BI INCL CAD: CPT | Performed by: STUDENT IN AN ORGANIZED HEALTH CARE EDUCATION/TRAINING PROGRAM

## 2025-04-21 RX ORDER — MELOXICAM 15 MG/1
15 TABLET ORAL
Qty: 30 TABLET | Refills: 0 | Status: SHIPPED | OUTPATIENT
Start: 2025-04-21

## 2025-04-23 ENCOUNTER — TELEPHONE (OUTPATIENT)
Dept: ORTHOPEDIC SURGERY | Facility: CLINIC | Age: 45
End: 2025-04-23
Payer: COMMERCIAL

## 2025-04-23 NOTE — TELEPHONE ENCOUNTER
Patient called and stated the Mobic has caused both of her ankles and hands to swell.  This has become painful.  I advised patient to stop taking the Mobic at this time until she hears from the team.  Please advise.  Patient can be reached at [phone].

## 2025-04-24 NOTE — TELEPHONE ENCOUNTER
Attempted to call patient and had to leave voicemail.  I let the patient know I was returning her call, she can take Tylenol and Ibuprofen until she sees us on 4/29/25 or we can send her in a different prescription for an NSAID.

## 2025-04-29 ENCOUNTER — OFFICE VISIT (OUTPATIENT)
Dept: ORTHOPEDIC SURGERY | Facility: CLINIC | Age: 45
End: 2025-04-29
Payer: COMMERCIAL

## 2025-04-29 DIAGNOSIS — M24.151 DEGENERATIVE TEAR OF ACETABULAR LABRUM OF RIGHT HIP: ICD-10-CM

## 2025-04-29 DIAGNOSIS — M24.152 DEGENERATIVE TEAR OF ACETABULAR LABRUM OF LEFT HIP: Primary | ICD-10-CM

## 2025-04-29 PROCEDURE — 1036F TOBACCO NON-USER: CPT | Performed by: ORTHOPAEDIC SURGERY

## 2025-04-29 PROCEDURE — 99213 OFFICE O/P EST LOW 20 MIN: CPT | Performed by: ORTHOPAEDIC SURGERY

## 2025-04-29 PROCEDURE — 99212 OFFICE O/P EST SF 10 MIN: CPT | Performed by: ORTHOPAEDIC SURGERY

## 2025-04-30 NOTE — PROGRESS NOTES
History of present illness    44-year-old female follow-up of her hip pain she states mostly her left hip that gives her trouble she still has pain in the groin she has some occasional popping and clicking she was unable to tolerate the meloxicam as she had joint swelling.  She has been working on her physical therapy but really continues to have a lot of difficulty with her hip and it is limiting her activities of daily living.      Past medical , Surgical, Family and social history reviewed.      Physical exam  General: No acute distress and breathing comfortably.  Patient is pleasant and cooperative with the examination.    Extremity  Examination of her left hip shows moderate pain with flexion abduction external rotation maneuver straight leg raise testing is negative brisk cap refill compartments are soft calf is nontender.    Diagnostics      Imaging  No results found.    Cardiology, Vascular, and Other Imaging  No other imaging results found for the past 7 days       Procedure  [ none]    Assessment  Persistent left-sided hip pain rule out internal derangement.    Treatment plan  1.  Patient continues to have mechanical symptoms in her left hip not sure if they are generated from her hip joint or from her back as she has constitution of symptoms that may be related to either.  In order to further evaluate her hip discomfort I recommended MRI she will follow-up once the MRI is completed.  2. [   ]  3. [   ]  4.  All of the patient's questions were answered.    Orders Placed This Encounter    MR hip left wo IV contrast    MR hip right wo IV contrast       This note was prepared using voice recognition software.  The details of this note are correct and have been reviewed, and corrected to the best of my ability.  Some grammatical areas may persist related to the Dragon software    Brett Hahn MD  Senior Attending Physician  [facility]  Orthopedic [place]    [phone]

## 2025-05-12 ENCOUNTER — APPOINTMENT (OUTPATIENT)
Dept: OBSTETRICS AND GYNECOLOGY | Facility: CLINIC | Age: 45
End: 2025-05-12
Payer: COMMERCIAL

## 2025-05-12 ENCOUNTER — PATIENT MESSAGE (OUTPATIENT)
Dept: OBSTETRICS AND GYNECOLOGY | Facility: CLINIC | Age: 45
End: 2025-05-12

## 2025-05-12 NOTE — PATIENT COMMUNICATION
Patient was a No show to her appointment with Dr Tsai today 5/12/2025 at 10:50am in [place].      Elsa Barnes,  CMA III

## 2025-06-04 DIAGNOSIS — M24.151 DEGENERATIVE TEAR OF ACETABULAR LABRUM OF RIGHT HIP: Primary | ICD-10-CM

## 2025-06-09 ENCOUNTER — PROCEDURE VISIT (OUTPATIENT)
Dept: NEUROLOGY | Facility: CLINIC | Age: 45
End: 2025-06-09
Payer: COMMERCIAL

## 2025-06-09 DIAGNOSIS — G43.709 CHRONIC MIGRAINE W/O AURA W/O STATUS MIGRAINOSUS, NOT INTRACTABLE: Primary | ICD-10-CM

## 2025-06-09 PROCEDURE — 2500000004 HC RX 250 GENERAL PHARMACY W/ HCPCS (ALT 636 FOR OP/ED): Mod: JZ | Performed by: STUDENT IN AN ORGANIZED HEALTH CARE EDUCATION/TRAINING PROGRAM

## 2025-06-09 PROCEDURE — 64615 CHEMODENERV MUSC MIGRAINE: CPT | Performed by: STUDENT IN AN ORGANIZED HEALTH CARE EDUCATION/TRAINING PROGRAM

## 2025-06-09 RX ADMIN — ONABOTULINUMTOXINA 165 UNITS: 100 INJECTION, POWDER, LYOPHILIZED, FOR SOLUTION INTRADERMAL; INTRAMUSCULAR at 12:14

## 2025-06-09 NOTE — PROGRESS NOTES
Subjective   Since last Botox, having 10/30 HA days per month. No side effects to Botox.      Botox    Date/Time: 6/9/2025 12:07 PM    Performed by: Hoang Sylvester DO  Authorized by: Hoang Sylvester DO    Procedure specific details:      Procedure Note: PREEMPT Botox     Indications: Chronic Migraine     Informed consent was obtained (explaining the procedure and risks and benefits of procedure) from patient: the signed consent form was placed in the medical record.  A time out was completed, verifying correct patient, procedure,site, positioning, and implants or special equipment.     200 units of OnabotulinumtoxinA were reconstituted with saline. Sites of injection were identified via PREEMPT protocol and cleaned with alcohol pads. Using a 30 gauge needle, patient received following injections:     5 units in procerus  5 units in each left and right   10 units divided between 2 sites of L frontalis  10 units divided between 2 sites of R frontalis  20 units divided between 4 sites of L temporalis  20 units divided between 4 sites of R temporalis  15 units divided between 3 sites of L occipitalis  15 units divided between 3 sites of R occipitalis  15 units divided between 3 sites of L trapezius  15 units divided between 3 sites of R trapezius     Sites Removed:  10 units divided between 2 sites of L paracervical muscles  10 units divided between 2 sites of R paracervical muscles     Additional Sites:  5u L trapezius  5u R trapezius  10u L temporalis  10u R temporalis     Used: 165u  Wasted: 35u     There were no complications. Patient was comfortable and left without complaint.      OnabotulinumtoxinA  Lot #: [id number]  Exp: 4/2027

## 2025-06-10 ENCOUNTER — HOSPITAL ENCOUNTER (OUTPATIENT)
Dept: RADIOLOGY | Facility: CLINIC | Age: 45
Discharge: HOME | End: 2025-06-10
Payer: COMMERCIAL

## 2025-06-10 DIAGNOSIS — M24.151 DEGENERATIVE TEAR OF ACETABULAR LABRUM OF RIGHT HIP: ICD-10-CM

## 2025-06-10 PROCEDURE — 73502 X-RAY EXAM HIP UNI 2-3 VIEWS: CPT | Mod: RIGHT SIDE | Performed by: ORTHOPAEDIC SURGERY

## 2025-06-10 PROCEDURE — 73502 X-RAY EXAM HIP UNI 2-3 VIEWS: CPT | Mod: RT

## 2025-06-28 ENCOUNTER — HOSPITAL ENCOUNTER (OUTPATIENT)
Dept: RADIOLOGY | Facility: HOSPITAL | Age: 45
Discharge: HOME | End: 2025-06-28
Payer: COMMERCIAL

## 2025-06-28 DIAGNOSIS — M24.151 DEGENERATIVE TEAR OF ACETABULAR LABRUM OF RIGHT HIP: ICD-10-CM

## 2025-06-28 DIAGNOSIS — M24.152 DEGENERATIVE TEAR OF ACETABULAR LABRUM OF LEFT HIP: ICD-10-CM

## 2025-06-28 PROCEDURE — 73721 MRI JNT OF LWR EXTRE W/O DYE: CPT | Mod: LT

## 2025-06-28 PROCEDURE — 73721 MRI JNT OF LWR EXTRE W/O DYE: CPT | Mod: RT

## 2025-07-02 ENCOUNTER — OFFICE VISIT (OUTPATIENT)
Dept: ORTHOPEDIC SURGERY | Facility: CLINIC | Age: 45
End: 2025-07-02
Payer: COMMERCIAL

## 2025-07-02 DIAGNOSIS — S73.192A ACETABULAR LABRUM TEAR, LEFT, INITIAL ENCOUNTER: ICD-10-CM

## 2025-07-02 DIAGNOSIS — S73.191A ACETABULAR LABRUM TEAR, RIGHT, INITIAL ENCOUNTER: Primary | ICD-10-CM

## 2025-07-02 RX ORDER — IBUPROFEN 800 MG/1
800 TABLET, FILM COATED ORAL EVERY 8 HOURS PRN
Qty: 90 TABLET | Refills: 3 | Status: SHIPPED | OUTPATIENT
Start: 2025-07-02 | End: 2025-10-30

## 2025-07-03 NOTE — PROGRESS NOTES
History of present illness    45-year-old female here for follow-up of her bilateral hip pain states her hip pain has gotten progressively worse she tried to do some yard work and that seemed to have aggravated things she is has a lot of popping clicking and grinding in her hips difficulty bearing weight difficulty standing for prolonged period time she had her MRI she is here to review the results.  She states the ibuprofen 800 has been helping but not completely      Past medical , Surgical, Family and social history reviewed.      Physical exam  General: No acute distress and breathing comfortably.  Patient is pleasant and cooperative with the examination.    Extremity  Moderate pain with internal ex rotation both hips flexion abduction external Tatian Hoovers positive straight leg raise testing is negative good muscle strength distally brisk cap refill compartments soft calf is nontender    Diagnostics  MRIs personally reviewed and interpreted by myself    Imaging  MR hip right wo IV contrast  Result Date: 6/30/2025  Mild osteoarthritic changes of both hips. There is subcortical degenerative cystic changes in the left superior/anterior acetabulum. There suspected linear tearing the base of the left anterior labrum. There is intrasubstance degeneration at the base of the anterior labrum on the right.   No sign of acute fracture or avascular necrosis.   Small right hip effusion.   Free pelvic fluid of uncertain significance.     MACRO: None   Signed by: Anibal Erwin 6/30/2025 8:58 AM Dictation workstation:   [id number]    MR hip left wo IV contrast  Result Date: 6/30/2025  Mild osteoarthritic changes of both hips. There is subcortical degenerative cystic changes in the left superior/anterior acetabulum. There suspected linear tearing the base of the left anterior labrum. There is intrasubstance degeneration at the base of the anterior labrum on the right.   No sign of acute fracture or  avascular necrosis.   Small right hip effusion.   Free pelvic fluid of uncertain significance.     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  The natural history of the condition and its associated treatment alternatives including surgical and nonsurgical options were discussed with the patient at length.  2.  Patient continues to complain of severe impairment of bodily function related to her hip and groin pain.  We discussed her MRI findings she does have some mild arthritis she is 45 years old she does have labral tearing.  I am not sure if she is a candidate for labral repair or not but she continues to have severe impairment in does not have severe hip arthritis I think it is worthwhile to have her evaluated by one of the hip arthroscopy specialist working to do a referral to Dr. Adhikari.  In the meantime she would like a refill on ibuprofen which is sent to pharmacy.  3. [   ]  4.  All of the patient's questions were answered.    Orders Placed This Encounter    Referral to Orthopedics and Sports Medicine    ibuprofen 800 mg tablet       This note was prepared using voice recognition software.  The details of this note are correct and have been reviewed, and corrected to the best of my ability.  Some grammatical areas may persist related to the Dragon software    Brett Hahn MD  Senior Attending Physician  [facility]  Orthopedic [place]    [phone]

## 2025-07-09 ENCOUNTER — HOSPITAL ENCOUNTER (OUTPATIENT)
Dept: RADIOLOGY | Facility: HOSPITAL | Age: 45
Discharge: HOME | End: 2025-07-09
Payer: COMMERCIAL

## 2025-07-09 DIAGNOSIS — R92.8 OTHER ABNORMAL AND INCONCLUSIVE FINDINGS ON DIAGNOSTIC IMAGING OF BREAST: ICD-10-CM

## 2025-07-09 PROCEDURE — 77065 DX MAMMO INCL CAD UNI: CPT | Mod: LEFT SIDE | Performed by: STUDENT IN AN ORGANIZED HEALTH CARE EDUCATION/TRAINING PROGRAM

## 2025-07-09 PROCEDURE — 77061 BREAST TOMOSYNTHESIS UNI: CPT | Mod: LT

## 2025-07-09 PROCEDURE — 77061 BREAST TOMOSYNTHESIS UNI: CPT | Mod: LEFT SIDE | Performed by: STUDENT IN AN ORGANIZED HEALTH CARE EDUCATION/TRAINING PROGRAM

## 2025-07-09 PROCEDURE — 76642 ULTRASOUND BREAST LIMITED: CPT | Mod: LEFT SIDE | Performed by: STUDENT IN AN ORGANIZED HEALTH CARE EDUCATION/TRAINING PROGRAM

## 2025-07-09 PROCEDURE — 76642 ULTRASOUND BREAST LIMITED: CPT | Mod: LT

## 2025-07-23 ENCOUNTER — APPOINTMENT (OUTPATIENT)
Dept: ORTHOPEDIC SURGERY | Facility: HOSPITAL | Age: 45
End: 2025-07-23
Payer: COMMERCIAL

## (undated) DEVICE — DRESSING, ABDOMINAL, WET PRUF, TENDERSORB, 5 X 9 IN, STERILE

## (undated) DEVICE — DRESSING, GAUZE, PETROLATUM, PATCH, XEROFORM, 1 X 8 IN, STERILE

## (undated) DEVICE — DRESSING, GAUZE, SUPER KERLIX, 6X6

## (undated) DEVICE — Device

## (undated) DEVICE — BANDAGE, COFLEX, 6 X 5 YDS, FOAM TAN, STERILE, LF

## (undated) DEVICE — TOWEL PACK, STERILE, 4/PACK, BLUE

## (undated) DEVICE — GLOVE, SURGICAL, PROTEXIS PI , 7.5, PF, LF

## (undated) DEVICE — PADDING, WEBRIL, UNDERCAST, STERILE, 6 IN

## (undated) DEVICE — GLOVE, SURGICAL, PROTEXIS PI W/NEU-THERA, 8.0, PF, LF

## (undated) DEVICE — GLOVE, SURGICAL, PROTEXIS PI BLUE W/NEUTHERA, 7.5, PF, LF

## (undated) DEVICE — BLADE, STRYKER, 4.0MM, AGG PLUS SHVBLD ULTMT

## (undated) DEVICE — TUBING, PUMP MAIN 16FT STERILE

## (undated) DEVICE — NEEDLE, HYPODERMIC, SPECIALTY, REGULAR WALL, SHORT BEVEL, 18 G X 1.5 IN

## (undated) DEVICE — BANDAGE, ELASTIC, MATRIX, SELF-CLOSURE, 6 IN X 5 YD, LF

## (undated) DEVICE — APPLICATOR, CHLORAPREP, W/ORANGE TINT, 26ML

## (undated) DEVICE — SUTURE, MONOCRYL, 3-0, 27 IN, PS-2, UNDYED

## (undated) DEVICE — STRIP, SKIN CLOSURE, STERI STRIP, REINFORCED, 0.5 X 4 IN

## (undated) DEVICE — BANDAGE, ESMARK, 6 IN X 12 FT